# Patient Record
Sex: FEMALE | Race: WHITE | NOT HISPANIC OR LATINO | Employment: PART TIME | URBAN - METROPOLITAN AREA
[De-identification: names, ages, dates, MRNs, and addresses within clinical notes are randomized per-mention and may not be internally consistent; named-entity substitution may affect disease eponyms.]

---

## 2017-03-01 PROCEDURE — G0145 SCR C/V CYTO,THINLAYER,RESCR: HCPCS | Performed by: OBSTETRICS & GYNECOLOGY

## 2017-03-02 ENCOUNTER — LAB REQUISITION (OUTPATIENT)
Dept: LAB | Facility: HOSPITAL | Age: 57
End: 2017-03-02
Payer: COMMERCIAL

## 2017-03-02 DIAGNOSIS — Z12.72 ENCOUNTER FOR SCREENING FOR MALIGNANT NEOPLASM OF VAGINA: ICD-10-CM

## 2017-03-02 DIAGNOSIS — Z01.419 ENCOUNTER FOR GYNECOLOGICAL EXAMINATION WITHOUT ABNORMAL FINDING: ICD-10-CM

## 2017-03-02 DIAGNOSIS — Z11.51 ENCOUNTER FOR SCREENING FOR HUMAN PAPILLOMAVIRUS (HPV): ICD-10-CM

## 2017-03-07 LAB
LAB AP GYN PRIMARY INTERPRETATION: NORMAL
Lab: NORMAL

## 2017-09-05 DIAGNOSIS — E55.9 VITAMIN D DEFICIENCY: ICD-10-CM

## 2017-09-05 DIAGNOSIS — E78.5 HYPERLIPIDEMIA: ICD-10-CM

## 2017-09-05 DIAGNOSIS — R73.9 HYPERGLYCEMIA: ICD-10-CM

## 2017-09-22 ENCOUNTER — APPOINTMENT (OUTPATIENT)
Dept: LAB | Facility: MEDICAL CENTER | Age: 57
End: 2017-09-22
Payer: COMMERCIAL

## 2017-09-22 DIAGNOSIS — E78.5 HYPERLIPIDEMIA: ICD-10-CM

## 2017-09-22 DIAGNOSIS — E55.9 VITAMIN D DEFICIENCY: ICD-10-CM

## 2017-09-22 DIAGNOSIS — R73.9 HYPERGLYCEMIA: ICD-10-CM

## 2017-09-22 LAB
25(OH)D3 SERPL-MCNC: 30.4 NG/ML (ref 30–100)
ALBUMIN SERPL BCP-MCNC: 3.4 G/DL (ref 3.5–5)
ALP SERPL-CCNC: 85 U/L (ref 46–116)
ALT SERPL W P-5'-P-CCNC: 26 U/L (ref 12–78)
ANION GAP SERPL CALCULATED.3IONS-SCNC: 7 MMOL/L (ref 4–13)
AST SERPL W P-5'-P-CCNC: 17 U/L (ref 5–45)
BASOPHILS # BLD AUTO: 0.02 THOUSANDS/ΜL (ref 0–0.1)
BASOPHILS NFR BLD AUTO: 0 % (ref 0–1)
BILIRUB SERPL-MCNC: 0.39 MG/DL (ref 0.2–1)
BUN SERPL-MCNC: 10 MG/DL (ref 5–25)
CALCIUM SERPL-MCNC: 9.1 MG/DL (ref 8.3–10.1)
CHLORIDE SERPL-SCNC: 107 MMOL/L (ref 100–108)
CHOLEST SERPL-MCNC: 189 MG/DL (ref 50–200)
CO2 SERPL-SCNC: 27 MMOL/L (ref 21–32)
CREAT SERPL-MCNC: 0.87 MG/DL (ref 0.6–1.3)
EOSINOPHIL # BLD AUTO: 0.1 THOUSAND/ΜL (ref 0–0.61)
EOSINOPHIL NFR BLD AUTO: 2 % (ref 0–6)
ERYTHROCYTE [DISTWIDTH] IN BLOOD BY AUTOMATED COUNT: 12.9 % (ref 11.6–15.1)
EST. AVERAGE GLUCOSE BLD GHB EST-MCNC: 114 MG/DL
GFR SERPL CREATININE-BSD FRML MDRD: 74 ML/MIN/1.73SQ M
GLUCOSE P FAST SERPL-MCNC: 91 MG/DL (ref 65–99)
HBA1C MFR BLD: 5.6 % (ref 4.2–6.3)
HCT VFR BLD AUTO: 40.4 % (ref 34.8–46.1)
HDLC SERPL-MCNC: 42 MG/DL (ref 40–60)
HGB BLD-MCNC: 13.7 G/DL (ref 11.5–15.4)
LDLC SERPL CALC-MCNC: 114 MG/DL (ref 0–100)
LYMPHOCYTES # BLD AUTO: 2.91 THOUSANDS/ΜL (ref 0.6–4.47)
LYMPHOCYTES NFR BLD AUTO: 44 % (ref 14–44)
MCH RBC QN AUTO: 30.9 PG (ref 26.8–34.3)
MCHC RBC AUTO-ENTMCNC: 33.9 G/DL (ref 31.4–37.4)
MCV RBC AUTO: 91 FL (ref 82–98)
MONOCYTES # BLD AUTO: 0.47 THOUSAND/ΜL (ref 0.17–1.22)
MONOCYTES NFR BLD AUTO: 7 % (ref 4–12)
NEUTROPHILS # BLD AUTO: 3.06 THOUSANDS/ΜL (ref 1.85–7.62)
NEUTS SEG NFR BLD AUTO: 47 % (ref 43–75)
NRBC BLD AUTO-RTO: 0 /100 WBCS
PLATELET # BLD AUTO: 205 THOUSANDS/UL (ref 149–390)
PMV BLD AUTO: 11 FL (ref 8.9–12.7)
POTASSIUM SERPL-SCNC: 4 MMOL/L (ref 3.5–5.3)
PROT SERPL-MCNC: 7.6 G/DL (ref 6.4–8.2)
RBC # BLD AUTO: 4.44 MILLION/UL (ref 3.81–5.12)
SODIUM SERPL-SCNC: 141 MMOL/L (ref 136–145)
TRIGL SERPL-MCNC: 166 MG/DL
TSH SERPL DL<=0.05 MIU/L-ACNC: 1.95 UIU/ML (ref 0.36–3.74)
WBC # BLD AUTO: 6.58 THOUSAND/UL (ref 4.31–10.16)

## 2017-09-22 PROCEDURE — 84443 ASSAY THYROID STIM HORMONE: CPT

## 2017-09-22 PROCEDURE — 80053 COMPREHEN METABOLIC PANEL: CPT

## 2017-09-22 PROCEDURE — 85025 COMPLETE CBC W/AUTO DIFF WBC: CPT

## 2017-09-22 PROCEDURE — 82306 VITAMIN D 25 HYDROXY: CPT

## 2017-09-22 PROCEDURE — 83036 HEMOGLOBIN GLYCOSYLATED A1C: CPT

## 2017-09-22 PROCEDURE — 80061 LIPID PANEL: CPT

## 2017-09-22 PROCEDURE — 36415 COLL VENOUS BLD VENIPUNCTURE: CPT

## 2017-09-29 ENCOUNTER — ALLSCRIPTS OFFICE VISIT (OUTPATIENT)
Dept: OTHER | Facility: OTHER | Age: 57
End: 2017-09-29

## 2017-10-27 NOTE — PROGRESS NOTES
Assessment  1  GERD without esophagitis (530 81) (K21 9)   2  Family history of rectal cancer (V16 0) (Z80 0) : Sister   3  Family history of breast cancer (V16 3) (Z80 3) : Sister   4  FH: colon cancer (V16 0) (Z80 0) : Brother    Plan  Actinic keratoses    · Destruction benign lesions other than skin tags <=14 - POC; Status:Active - Perform  Order; Requested RJL:[id number];    · Rem Skin Tags Up To 15 Le - POC; Status:Active - Perform Order; Requested  MGS:[id number]; Aortic valve disease, Hyperlipidemia    · (1) COMPREHENSIVE METABOLIC PANEL; Status:Active; Requested for:2018;   GERD without esophagitis    · *1 - [facility] Co-Management  *  Status: Active   Requested for:   Care Summary provided  : Yes  Hyperlipidemia    · (1) LIPID PANEL, FASTING; Status:Active; Requested FVS:[id number];     Discussion/Summary    AK LEFT KNEE- LN2 APPLIEDRIGHT KNEE- LN2 APPLIEDTAG RIGHT FLANK- REMOVEDWORSENING WITH INCREASED DOSE- REFER TO GI FOR EVAL; SEES DR YANG FOR YEARLY COLONO- STRONG FAMILY HX OF COLON CA  Chief Complaint  1  Skin Lesions  PATIENT WITH SPOT ON HER LEG; - AK ON LEFT LATERAL KNEE; RIGHT MEDIAL KNEE, SKIN TAG ON RIGHT FLANK; HAS GERD- RECENT INCREASE IN PPI FROM 20-40 - DI DNOT HELP ; STILL HAVING GERD; HX OF RECTAL CA(SISTER) - COLON CANCER; History of Present Illness  Gastroesophageal Reflux Disease GERD Pathway: The patient is being seen for worsening symptoms of gastroesophageal reflux disease  The patient is currently experiencing symptoms Symptoms: heartburn,-- acid regurgitation,-- no nausea,-- no vomiting,-- no sore throat,-- no dysphagia,-- no odynophagia,-- no hematemesis,-- no melena-- and-- no anemia  no epigastric pain no abdominal pain Associated symptoms:  no anorexia,-- no early satiety,-- no bloating,-- no weight loss,-- no halitosis,-- no hoarseness,-- no cough-- and-- no wheezing  No associated symptoms are reported     Additional Risk Factors:  Patient is greater than 48years of age  -- Patient has been experiencing symptoms less than 10 years  Current treatment includes proton pump inhibitors  By report, there is good compliance with treatment, fair tolerance of treatment and fair symptom control  HPI: PT WITH WORSENING SX OF GERD- INCREASED DOSE TO 40 MG - STRONG FAMILY HX OF COLON/GI AND RECTAL CA (IN 50'S)- HAS COLONO YEARLY AND HAS WORSENING GERD; NO WT LOSS ; NO TOBACCO ; NO DYSPHAGIA TO SOLIDS OR LIQUIDSHAS LESION LEFT LEG ,R IGHT LEG AND RIGHT FLANK       Skin Lesions: Avani Able presents with complaints of skin lesions  Associated symptoms include multiple skin lesions, but-- no chills,-- no fever,-- no joint pain,-- no bleeding,-- no numbness,-- no shortness of breath,-- no vomiting-- and-- no wheezing  Review of Systems    Constitutional: No fever, no chills, feels well, no tiredness, no recent weight gain or loss,-- not feeling poorly-- and-- not feeling tired  ENT: no ear ache, no loss of hearing, no nosebleeds or nasal discharge, no sore throat or hoarseness,-- no earache,-- no nosebleeds,-- no hearing loss-- and-- no nasal discharge  Cardiovascular: no complaints of slow or fast heart rate, no chest pain, no palpitations, no leg claudication or lower extremity edema,-- the heart rate was not slow,-- no chest pain,-- no intermittent leg claudication,-- the heart rate was not fast,-- no palpitations-- and-- no lower extremity edema  Respiratory: no complaints of shortness of breath, no wheezing, no dyspnea on exertion, no orthopnea or PND,-- no shortness of breath,-- no cough,-- no wheezing-- and-- no shortness of breath during exertion  Breasts: no complaints of breast pain, breast lump or nipple discharge-- and-- no breast swelling     Gastrointestinal: GERD, but-- no complaints of abdominal pain, no constipation, no nausea or diarrhea, no vomiting, no bloody stools,-- as noted in HPI,-- no abdominal pain,-- no nausea,-- no constipation-- and-- no diarrhea  Genitourinary: no complaints of dysuria, no incontinence, no pelvic pain, no dysmenorrhea, no vaginal discharge or abnormal vaginal bleeding,-- no dysuria,-- no pelvic pain,-- no vaginal discharge,-- no incontinence-- and-- no dysmenorrhea  Musculoskeletal: no complaints of arthralgia, no myalgia, no joint swelling or stiffness, no limb pain or swelling,-- no joint swelling-- and-- no joint stiffness  Integumentary: skin lesion, but-- no complaints of skin rash or lesion, no itching or dry skin, no skin wounds,-- as noted in HPI,-- no itching-- and-- no skin wound  Neurological: no complaints of headache, no confusion, no numbness or tingling, no dizziness or fainting,-- no numbness,-- no tingling,-- no dizziness-- and-- no fainting  ROS reviewed  Active Problems  1  Aortic valve disease (424 1) (I35 9)   2  Encounter for screening for malignant neoplasm of colon (V76 51) (Z12 11)   3  Hyperglycemia (790 29) (R73 9)   4  Hyperlipidemia (272 4) (E78 5)   5  Nipple dermatitis (692 9) (L30 9)   6  Palpitations (785 1) (R00 2)   7  Plantar fasciitis (728 71) (M72 2)   8  PVC (premature ventricular contraction) (427 69) (I49 3)   9  Rectal polyp (569 0) (K62 1)   10  Shortness of breath (786 05) (R06 02)   11  Visit for screening mammogram (V76 12) (Z12 31)   12  Vitamin D deficiency (268 9) (E55 9)    Past Medical History  Active Problems And Past Medical History Reviewed: The active problems and past medical history were reviewed and updated today  Surgical History  Surgical History Reviewed: The surgical history was reviewed and updated today  Social History   · Alcohol Use (History)   · Never A Smoker  The social history was reviewed and updated today  The social history was reviewed and is unchanged  Family History  Family History Reviewed: The family history was reviewed and updated today  Current Meds   1   Omeprazole 40 MG Oral Capsule Delayed Release; TAKE 1 CAPSULE Daily; Therapy: [id number] to (Yogesh Piper)  Requested for: 37Zdo0502; Last   Rx:[id number] Ordered   2  Rosuvastatin Calcium 5 MG Oral Tablet; TAKE 1 TABLET DAILY; Therapy: 30Mhl2453 to (Evaluate:[id number])  Requested for: 99ZBB7258; Last   Rx:[id number] Ordered    The medication list was reviewed and updated today  Allergies  1  Penicillins    Vitals   Recorded: 03XWF8330 08:45AM   Temperature 98 4 F   Heart Rate 72   Respiration 18   Systolic 742   Diastolic 80   Height 5 ft 7 5 in   Weight 194 lb    BMI Calculated 29 94   BSA Calculated 2 01     Physical Exam    Constitutional   General appearance: No acute distress, well appearing and well nourished  Eyes   Conjunctiva and lids: No swelling, erythema or discharge  Pupils and irises: Equal, round and reactive to light  Ears, Nose, Mouth, and Throat   External inspection of ears and nose: Normal     Otoscopic examination: Tympanic membranes translucent with normal light reflex  Canals patent without erythema  Nasal mucosa, septum, and turbinates: Normal without edema or erythema  Oropharynx: Normal with no erythema, edema, exudate or lesions  Pulmonary   Respiratory effort: No increased work of breathing or signs of respiratory distress  Auscultation of lungs: Clear to auscultation  Auscultation of the lungs revealed no expiratory wheezing,-- normal expiratory time-- and-- no inspiratory wheezing  no rales or crackles were heard bilaterally  no rhonchi  no friction rub  no wheezing  no diminished breath sounds  no bronchial breath sounds  Cardiovascular   Palpation of heart: Normal PMI, no thrills  Auscultation of heart: Normal rate and rhythm, normal S1 and S2, without murmurs  The heart rate was normal  The rhythm was regular  Heart sounds: normal S1,-- normal S2,-- no S3-- and-- no S4  no murmurs were heard     Examination of extremities for edema and/or varicosities: Normal     Carotid pulses: Normal     Abdomen   Abdomen: Non-tender, no masses  Bowel sounds were normal  The abdomen was soft and nontender  no masses palpated  Liver and spleen: No hepatomegaly or splenomegaly  Lymphatic   Palpation of lymph nodes in neck: No lymphadenopathy  Musculoskeletal   Gait and station: Normal     Digits and nails: Normal without clubbing or cyanosis  Inspection/palpation of joints, bones, and muscles: Normal     Skin   Skin and subcutaneous tissue: Abnormal  -- SMALL AK LEFT LATERAL KNEE; SMALL AK RIGHT MEDIAL THIGH AT KNEE; SKIN TAG RIGHT FLANK  Neurologic   Cranial nerves: Cranial nerves 2-12 intact  Reflexes: 2+ and symmetric  Sensation: No sensory loss  Psychiatric   Orientation to person, place, and time: Normal     Mood and affect: Normal          Procedure    Procedure: excision of lesion  Indications for the procedure include SKIN TAG RIGHT ANT CHEST WALL  Risks, benefits, infection risk and bleeding risk were discussed with the patient  Written consent was obtained prior to the procedure  Procedure Note:   Anesthesia: 0 5 CC ml of lidocaine 2% without epinephrine  The lesion was located on the RIGHT ANT CHEST WALL  The patient was prepped and draped in the usual sterile fashion using Betadine,-- using alcohol-- and-- BLUNT DISSECTION- REMOVED LESION IN TOTAL; ALCL CLOSED THE WOUND  Destruction Technique: liquid nitrogen application  Dressing: The wound was cleaned and a sterile dressing was placed and AK LEFT KNEE 4 MM ; RIGHT KNEE 8 MM  Skin Lesion #2:   Procedure Note:  Anesthesia: The lesion was located on the LEFT LATERAL KNEE- LN2 APPLIED; RIGHT MEDIAL IKNEE - AK - LN2 APPLIED  The patient was prepped and draped in the usual sterile fashion using alcohol  LN2 APPLIED TO BOTH LESIONS  Post-Procedure:     Procedure: skin tag removal    Indications for the procedure include RIGHT FLANK     Risks, infection risk, bleeding risk and the risk of scarring were discussed with the patient--   written consent was obtained prior to the procedure  Procedure Note:   Anesthesia: 1 ml of lidocaine 2% without epinephrine  The patient was prepped using Betadine-- and-- using alcohol  Removal Technique: 11 BLADE--   1 skin tags removed  The hemostasis of the wound was achieved with aluminum chloride and L  Dressing: The wound was cleaned and a sterile dressing was placed  Post-Procedure:   Patient Status: the patient tolerated the procedure well  Complications: there were no complications  Follow-up in the office as needed  Future Appointments    Date/Time Provider Specialty Site   11/28/2017 03:20 PM SHARON Conway  [facility] [place] [address]     Signatures   Electronically signed by :  Mai [address] [place] [id number]; Sep 29 2017  3:12PM EST                       (Author)

## 2017-11-30 ENCOUNTER — ANESTHESIA EVENT (OUTPATIENT)
Dept: GASTROENTEROLOGY | Facility: HOSPITAL | Age: 57
End: 2017-11-30
Payer: COMMERCIAL

## 2017-12-01 ENCOUNTER — HOSPITAL ENCOUNTER (OUTPATIENT)
Facility: HOSPITAL | Age: 57
Setting detail: OUTPATIENT SURGERY
Discharge: HOME | End: 2017-12-01
Attending: COLON & RECTAL SURGERY | Admitting: COLON & RECTAL SURGERY
Payer: COMMERCIAL

## 2017-12-01 ENCOUNTER — GENERIC CONVERSION - ENCOUNTER (OUTPATIENT)
Dept: GASTROENTEROLOGY | Facility: CLINIC | Age: 57
End: 2017-12-01

## 2017-12-01 ENCOUNTER — GENERIC CONVERSION - ENCOUNTER (OUTPATIENT)
Dept: OTHER | Facility: OTHER | Age: 57
End: 2017-12-01

## 2017-12-01 ENCOUNTER — ANESTHESIA (OUTPATIENT)
Dept: GASTROENTEROLOGY | Facility: HOSPITAL | Age: 57
End: 2017-12-01
Payer: COMMERCIAL

## 2017-12-01 VITALS
HEART RATE: 74 BPM | TEMPERATURE: 97.2 F | DIASTOLIC BLOOD PRESSURE: 86 MMHG | WEIGHT: 190 LBS | HEIGHT: 67 IN | BODY MASS INDEX: 29.82 KG/M2 | RESPIRATION RATE: 16 BRPM | SYSTOLIC BLOOD PRESSURE: 117 MMHG | OXYGEN SATURATION: 99 %

## 2017-12-01 DIAGNOSIS — K62.0 ANAL POLYP: ICD-10-CM

## 2017-12-01 DIAGNOSIS — K21.9 GASTRO-ESOPHAGEAL REFLUX DISEASE WITHOUT ESOPHAGITIS: ICD-10-CM

## 2017-12-01 PROCEDURE — 88342 IMHCHEM/IMCYTCHM 1ST ANTB: CPT | Performed by: COLON & RECTAL SURGERY

## 2017-12-01 PROCEDURE — 88305 TISSUE EXAM BY PATHOLOGIST: CPT | Performed by: INTERNAL MEDICINE

## 2017-12-01 PROCEDURE — 88305 TISSUE EXAM BY PATHOLOGIST: CPT | Performed by: COLON & RECTAL SURGERY

## 2017-12-01 RX ORDER — PROPOFOL 10 MG/ML
INJECTION, EMULSION INTRAVENOUS AS NEEDED
Status: DISCONTINUED | OUTPATIENT
Start: 2017-12-01 | End: 2017-12-01 | Stop reason: SURG

## 2017-12-01 RX ORDER — SODIUM CHLORIDE 9 MG/ML
125 INJECTION, SOLUTION INTRAVENOUS CONTINUOUS
Status: DISCONTINUED | OUTPATIENT
Start: 2017-12-01 | End: 2017-12-01 | Stop reason: HOSPADM

## 2017-12-01 RX ADMIN — PROPOFOL 30 MG: 10 INJECTION, EMULSION INTRAVENOUS at 11:31

## 2017-12-01 RX ADMIN — PROPOFOL 30 MG: 10 INJECTION, EMULSION INTRAVENOUS at 11:25

## 2017-12-01 RX ADMIN — PROPOFOL 20 MG: 10 INJECTION, EMULSION INTRAVENOUS at 11:50

## 2017-12-01 RX ADMIN — SODIUM CHLORIDE: 0.9 INJECTION, SOLUTION INTRAVENOUS at 11:44

## 2017-12-01 RX ADMIN — PROPOFOL 30 MG: 10 INJECTION, EMULSION INTRAVENOUS at 11:33

## 2017-12-01 RX ADMIN — PROPOFOL 30 MG: 10 INJECTION, EMULSION INTRAVENOUS at 11:28

## 2017-12-01 RX ADMIN — PROPOFOL 30 MG: 10 INJECTION, EMULSION INTRAVENOUS at 11:36

## 2017-12-01 RX ADMIN — PROPOFOL 30 MG: 10 INJECTION, EMULSION INTRAVENOUS at 11:48

## 2017-12-01 RX ADMIN — PROPOFOL 100 MG: 10 INJECTION, EMULSION INTRAVENOUS at 11:43

## 2017-12-01 RX ADMIN — PROPOFOL 30 MG: 10 INJECTION, EMULSION INTRAVENOUS at 11:19

## 2017-12-01 RX ADMIN — SODIUM CHLORIDE: 0.9 INJECTION, SOLUTION INTRAVENOUS at 10:47

## 2017-12-01 RX ADMIN — PROPOFOL 50 MG: 10 INJECTION, EMULSION INTRAVENOUS at 11:17

## 2017-12-01 RX ADMIN — PROPOFOL 50 MG: 10 INJECTION, EMULSION INTRAVENOUS at 11:45

## 2017-12-01 RX ADMIN — PROPOFOL 100 MG: 10 INJECTION, EMULSION INTRAVENOUS at 11:16

## 2017-12-01 RX ADMIN — SODIUM CHLORIDE 125 ML/HR: 0.9 INJECTION, SOLUTION INTRAVENOUS at 09:27

## 2017-12-01 NOTE — H&P
History and Physical   Colon and Rectal Surgery   Romana Kahn 62 y o  female MRN: [id number]  Unit/Bed#: [facility] Encounter: [id number]  17   11:02 AM          ASSESSMENT: Surveillance, increased family risk, personal history of polyps  Recent abdominal pain, reflux for EGD as well today  Risks including not limited to bleeding, missed lesion, perforation requiring emergent surgery discussed/understood  PLAN:  Colonoscopy    History of Present Illness   HPI:  Romana Kahn is a 62 y o  female who presents for screening, history colon polyps  She also has had some recent abdominal pain moving screening up as well as new family history her sister was just recently  diagnosed with rectal cancer at 54yo, her brother was diagnosed with colon cancer at age 54 and passed away at 58  Lico Granville is having reflux as well and scheduled for EGD  Historical Information   Past Medical History:   Diagnosis Date    Family hx of colon cancer requiring screening colonoscopy     sister rectal ca,brother  colon cancer,sister breast cancer    GERD (gastroesophageal reflux disease)     High cholesterol     History of colon polyps     Osteoarthritis      Past Surgical History:   Procedure Laterality Date     SECTION      COLONOSCOPY N/A 2016    Procedure: COLONOSCOPY;  Surgeon: Victor M Mercedes MD;  Location: [facility];   Service:     COLONOSCOPY W/ POLYPECTOMY         Meds/Allergies     Prescriptions Prior to Admission   Medication    Nutritional Supplements (VITAMIN D MAINTENANCE PO)    omeprazole (PriLOSEC) 20 mg delayed release capsule    rosuvastatin (CRESTOR) 5 mg tablet         Current Facility-Administered Medications:     sodium chloride 0 9 % infusion, 125 mL/hr, Intravenous, Continuous, Carmen Ley MD, Last Rate: 125 mL/hr at 17 0927, 125 mL/hr at 17 5289    Allergies   Allergen Reactions    Penicillins Hives         Social History   History   Alcohol Use    Yes Comment: 2 drinks/night     History   Drug Use No     History   Smoking Status    Never Smoker   Smokeless Tobacco    Never Used         Family History: History reviewed  No pertinent family history        Objective     Current Vitals:   Blood Pressure: 136/86 (12/01/17 0914)  Pulse: 90 (12/01/17 0914)  Temperature: (!) 97 2 °F (36 2 °C) (12/01/17 0914)  Temp Source: Tympanic (12/01/17 0914)  Respirations: 18 (12/01/17 0914)  Height: 5' 7" (170 2 cm) (12/01/17 0914)  Weight - Scale: 86 2 kg (190 lb) (12/01/17 0914)  SpO2: 97 % (12/01/17 0914)  No intake or output data in the 24 hours ending 12/01/17 1102    Physical Exam:  General:no distress  Eyes:perrla/eomi  ENT:moist mucus membranes  Neck:supple  Pulm:no increased work of breathing  CV:sinus  Abdomen:soft,nontender  Rectal:normal perianal skin/sphincter tone, no masses palpated  Extremities:no edema  Lymphatics:no neck/axillary/groin lymphadenopathy     Ref Range & Units 9/22/17 1008   WBC 4 31 - 10 16 Thousand/uL 6 58    RBC 3 81 - 5 12 Million/uL 4 44    Hemoglobin 11 5 - 15 4 g/dL 13 7    Hematocrit 34 8 - 46 1 % 40 4    MCV 82 - 98 fL 91    MCH 26 8 - 34 3 pg 30 9    MCHC 31 4 - 37 4 g/dL 33 9    RDW 11 6 - 15 1 % 12 9    MPV 8 9 - 12 7 fL 11 0    Platelets 142 - 890 Thousands/uL 205    nRBC /100 WBCs 0    Neutrophils Relative 43 - 75 % 47    Lymphocytes Relative 14 - 44 % 44    Monocytes Relative 4 - 12 % 7    Eosinophils Relative 0 - 6 % 2    Basophils Relative 0 - 1 % 0    Neutrophils Absolute 1 85 - 7 62 Thousands/µL 3 06    Lymphocytes Absolute 0 60 - 4 47 Thousands/µL 2 91    Monocytes Absolute 0 17 - 1 22 Thousand/µL 0 47    Eosinophils Absolute 0 00 - 0 61 Thousand/µL 0 10    Basophils Absolute 0 00 - 0 10 Thousands/µL 0 02       Specimen Collected: 09/22/17 10:08 Last Resulted: 09/22/17 13:07                      Other Results from 9/22/2017        Comprehensive metabolic panel   Order: [id number]     Status:  Final result   Visible to patient:  No (Not Released)   Next appt:  None   Dx:  Hyperglycemia    Ref Range & Units 9/22/17 1008 Flag   Sodium 136 - 145 mmol/L 141     Potassium 3 5 - 5 3 mmol/L 4 0     Chloride 100 - 108 mmol/L 107     CO2 21 - 32 mmol/L 27     Anion Gap 4 - 13 mmol/L 7     BUN 5 - 25 mg/dL 10     Creatinine 0 60 - 1 30 mg/dL 0 87     Comments: Standardized to IDMS reference method   Glucose, Fasting 65 - 99 mg/dL 91     Comments:    Specimen collection should occur prior to Sulfasalazine administration due to the potential for falsely depressed results  Specimen collection should occur prior to Sulfapyridine administration due to the potential for falsely elevated results  Calcium 8 3 - 10 1 mg/dL 9 1     AST 5 - 45 U/L 17     Comments:    Specimen collection should occur prior to Sulfasalazine administration due to the potential for falsely depressed results  ALT 12 - 78 U/L 26     Comments:    Specimen collection should occur prior to Sulfasalazine and/or Sulfapyridine administration due to the potential for falsely depressed results      Alkaline Phosphatase 46 - 116 U/L 85     Total Protein 6 4 - 8 2 g/dL 7 6     Albumin 3 5 - 5 0 g/dL 3 4   L    Total Bilirubin 0 20 - 1 00 mg/dL 0 39     eGFR ml/min/1 73sq m 74     Narrative       [employer] Education

## 2017-12-01 NOTE — ANESTHESIA POSTPROCEDURE EVALUATION
Post-Op Assessment Note      CV Status:  Stable    Mental Status:  Alert and awake    Hydration Status:  Euvolemic    PONV Controlled:  Controlled    Airway Patency:  Patent    Post Op Vitals Reviewed: Yes          Staff: Anesthesiologist, CRNA           /61 (12/01/17 1158)    Temp     Pulse 81 (12/01/17 1158)   Resp 20 (12/01/17 1158)    SpO2 98 % (12/01/17 1158)

## 2017-12-01 NOTE — OP NOTE
**** GI/ENDOSCOPY REPORT ****     PATIENT NAME: Rivas Hawley ------ VISIT ID:  Patient ID:   [id number] YOB: 1960     INTRODUCTION: Colonoscopy - A 62 female patient presents for an outpatient   Colonoscopy at [facility]  PREVIOUS COLONOSCOPY: 2016     INDICATIONS: Surveillance, history polyps, TEM for tubulovillous adenoma   rectum 2014  Family history rectal cancer, colon cancer Recent abdominal   pain     CONSENT:  The benefits, risks, and alternatives to the procedure were   discussed and informed consent was obtained from the patient  PREPARATION: EKG, pulse, pulse oximetry and blood pressure were monitored   throughout the procedure  The patient was identified by myself both   verbally and by visual inspection of ID band  MEDICATIONS: Anesthesia-check records     PROCEDURE:  The endoscope was passed without difficulty through the anus   under direct visualization and advanced to the cecum, confirmed by   appendiceal orifice and ileocecal valve  The scope was withdrawn and the   mucosa was carefully examined  The quality of the preparation was adequate   prep  Cecal Intubation Time: 4 minutes(s) Scope Withdrawal Time: 18   minutes(s)     RECTAL EXAM: Normal rectal exam      FINDINGS:  TEM scar low rectum, no residual   Diminutive rectal polyp,   hyperplastic appearance, removed cold biopsy  COMPLICATIONS: There were no complications  IMPRESSIONS: TEM scar low rectum, no residual  Diminutive rectal polyp,   hyperplastic appearance, removed cold biopsy  RECOMMENDATIONS: Colonoscopy recommended in 3 years  Start high fiber   diet   Genetic counseling, Bryon Sylvester Phani 87 [phone]     ESTIMATED BLOOD LOSS:     PATHOLOGY SPECIMENS: Yes     PROCEDURE CODES: Colonoscopy with biopsy     ICD-9 Codes:     ICD-10 Codes:     PERFORMED BY: SHARON Naranjo  on 12/01/2017  Version 1, electronically signed by SHARON Lemus  on   12/01/2017 at 11:44

## 2017-12-01 NOTE — OP NOTE
**** GI/ENDOSCOPY REPORT ****     PATIENT NAME: Mela Bass - VISIT ID:  Patient ID: [id number]   YOB: 1960     INTRODUCTION: Esophagogastroduodenoscopy - A 62 female patient presents   for an outpatient Esophagogastroduodenoscopy at [address] [place] [address]  INDICATIONS: GERD  Pain located in the epigastrium  CONSENT: The benefits, risks, and alternatives to the procedure were   discussed and informed consent was obtained from the patient  PREPARATION:  EKG, pulse, pulse oximetry and blood pressure were monitored   throughout the procedure  ASA Classification: Class 2 - Patient has mild   to moderate systemic disturbance that may or may not be related to the   disorder requiring surgery  MEDICATIONS: Anesthesia-check records     PROCEDURE:  The endoscope was passed without difficulty through the mouth   under direct visualization and advanced to the 2nd portion of the   duodenum  The scope was withdrawn and the mucosa was carefully examined  Retroflexion was performed  FINDINGS:   Esophagus: The esophagus appeared to be normal   GE junction:   There was a small sliding hiatus hernia visible in the GE junction  Stomach: Multiple random biopsies was taken  A few small polyps was found   in the fundus  The largest polyp measured 6mm and was removed by   polypectomy with a cold snare  Duodenum: The duodenum appeared to be   normal    Multiple random biopsies was taken  COMPLICATIONS: There were no complications  IMPRESSIONS: Normal esophagus  A hiatus hernia found  Polyps found in the   fundus  Polypectomy was performed on the largest polyp (6mm)  Normal   duodenum  Multiple biopsies taken  RECOMMENDATIONS: Follow-up on the results of the biopsy specimens  Anti-reflux measures: Raise the head of the bed 4 to 6 inches  Avoid   smoking  Avoid excess coffee, tea or other caffeinated beverages   Avoid   garments that fit tightly through the abdomen  Avoid eating before bed  Continue current medications  Follow-up appointment with endoscopist on an   as needed basis  ESTIMATED BLOOD LOSS:     PATHOLOGY SPECIMENS: Multiple random biopsies taken  Polypectomy   performed, using, cold snare  Multiple random biopsies taken  PROCEDURE CODES:     ICD-9 Codes: 530 81 Esophageal reflux 789 06 Abdominal pain, epigastric   553 3 Diaphragmatic hernia without mention of obstruction or gangrene   211 1 Benign neoplasm of stomach     ICD-10 Codes: K21 Gastro-esophageal reflux disease R10 13 Epigastric pain   K44 Diaphragmatic hernia 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

## 2017-12-01 NOTE — ANESTHESIA PREPROCEDURE EVALUATION
Review of Systems/Medical History  Patient summary reviewed    No history of anesthetic complications     Cardiovascular  Hyperlipidemia,    Pulmonary  Negative pulmonary ROS ,        GI/Hepatic    GERD , Bowel prep       Negative  ROS        Endo/Other  Negative endo/other ROS      GYN       Hematology  Negative hematology ROS      Musculoskeletal  Negative musculoskeletal ROS        Neurology  Negative neurology ROS      Psychology   Negative psychology ROS            Physical Exam    Airway    Mallampati score: II  TM Distance: >3 FB  Neck ROM: full     Dental   No notable dental hx     Cardiovascular      Pulmonary      Other Findings        Anesthesia Plan  ASA Score- 2       Anesthesia Type-       Induction- intravenous  Informed Consent- Anesthetic plan and risks discussed with patient  I personally reviewed this patient with the CRNA  Discussed and agreed on the Anesthesia Plan with the CRNA  Cassandra Gandhi

## 2017-12-05 ENCOUNTER — GENERIC CONVERSION - ENCOUNTER (OUTPATIENT)
Dept: OTHER | Facility: OTHER | Age: 57
End: 2017-12-05

## 2018-01-12 VITALS
WEIGHT: 194 LBS | HEIGHT: 68 IN | HEART RATE: 72 BPM | RESPIRATION RATE: 18 BRPM | SYSTOLIC BLOOD PRESSURE: 108 MMHG | BODY MASS INDEX: 29.4 KG/M2 | TEMPERATURE: 98.4 F | DIASTOLIC BLOOD PRESSURE: 80 MMHG

## 2018-01-16 NOTE — RESULT NOTES
Verified Results  (1) COMPREHENSIVE METABOLIC PANEL [id number] 09:75MI Ross Gaytan     Test Name Result Flag Reference   GLUCOSE,RANDM 100 mg/dL     If the patient is fasting, the ADA then defines impaired fasting glucose as > 100 mg/dL and diabetes as > or equal to 123 mg/dL  SODIUM 141 mmol/L  136-145   POTASSIUM 4 3 mmol/L  3 5-5 3   CHLORIDE 108 mmol/L  100-108   CARBON DIOXIDE 28 mmol/L  21-32   ANION GAP (CALC) 5 mmol/L  4-13   BLOOD UREA NITROGEN 16 mg/dL  5-25   CREATININE 0 79 mg/dL  0 60-1 30   Standardized to IDMS reference method   CALCIUM 9 1 mg/dL  8 3-10 1   BILI, TOTAL 0 43 mg/dL  0 20-1 00   ALK PHOSPHATAS 89 U/L     ALT (SGPT) 29 U/L  12-78   AST(SGOT) 18 U/L  5-45   ALBUMIN 3 6 g/dL  3 5-5 0   TOTAL PROTEIN 7 1 g/dL  6 4-8 2   eGFR Non-African American      >60 0 ml/min/1 73sq [facility] [employer] Disease Education Program recommendations are as follows:  GFR calculation is accurate only with a steady state creatinine  Chronic Kidney disease less than 60 ml/min/1 73 sq  meters  Kidney failure less than 15 ml/min/1 73 sq  meters       (1) VITAMIN D 25-HYDROXY [id number] 09:04AM Ross Gaytan     Test Name Result Flag Reference   VIT D 25-HYDROX 33 2 ng/mL  30 0-100 0       Discussion/Summary   Labs are all good!!

## 2018-01-23 NOTE — RESULT NOTES
Discussion/Summary   Stomach and duodenum biopsies were negative  Verified Results  (1) TISSUE EXAM [id number] 11:48AM Saman Salgado     Test Name Result Flag Reference   LAB AP CASE REPORT (Report)     Surgical Pathology Report             Case: [id number]                   Authorizing Provider: Subhash Monaco MD   Collected:      12/01/2017 1139        Ordering Location:   [address] [place] [address]   Received:      12/01/2017 [address] [facility]                               Pathologist:      Guido Preciado DO                               Specimens:  A) - Polyp, Colorectal, rectal polyp  cold bx                             B) - Stomach, r/o hpylori                                       C) - Polyp, Stomach/Small Intestine, gastric polyp                           D) - Duodenum, r/o celiac   LAB AP FINAL DIAGNOSIS (Report)     A  Rectum, polyp, biopsy:  - Hyperplastic polyp  B  Stomach, biopsy:  - Changes of reactive gastropathy  - Immunostain for Helicobacter pylori is negative  C  Stomach polyp, biopsy:  - Fundic gland polyp  D  Duodenum, biopsy:  - Duodenal mucosa with no significant pathologic abnormalities  - No villous atrophy or increased intraepithelial lymphocytes identified  Interpretation performed at [facility] [address]  [facility],   [facility]    Electronically signed by Guido Preciado DO on 12/4/2017 at 11:27 AM   LAB AP SURGICAL ADDITIONAL INFORMATION (Report)     All controls performed with the immunohistochemical stains reported above   reacted appropriately  These tests were developed and their performance   characteristics determined by [facility] or   [address] [place] [address]  They may not be cleared or approved by the [place]  Food and Drug Administration  The FDA has determined that such clearance   or approval is not necessary  These tests are used for clinical purposes     They should not be regarded as investigational or for research  This   laboratory has been approved by [place] [id number], designated as a high-complexity   laboratory and is qualified to perform these tests  LAB AP GROSS DESCRIPTION (Report)     A  The specimen is received in formalin, labeled with the patient's name   and hospital number, and is designated rectal polyp biopsy  The   specimen consists of one tan soft tissue fragment measuring 0 4 cm in   greatest dimension  Entirely submitted in one cassette  B  The specimen is received in formalin, labeled with the patient's name   and hospital number, and is designated Stomach tissue  The specimen   consists of multiple tan soft tissue fragments measuring in loose   aggregate 0 6 x 0 2 x 0 2 cm  Entirely submitted in one cassette  C  The specimen is received in formalin, labeled with the patient's name   and hospital number, and is designated Gastric polyp  The specimen   consists of one tan soft tissue fragment measuring 0 4 cm in greatest   dimension  Entirely submitted in one cassette  D  The specimen is received in formalin, labeled with the patient's name   and hospital number, and is designated Duodenum  The specimen consists   of one tan soft tissue fragment measuring 0 4 cm in greatest dimension  Entirely submitted in one cassette  Note: The estimated total formalin fixation time based upon information   provided by the submitting clinician and the standard processing schedule   is less than 72 hours      Ayesha

## 2018-01-26 ENCOUNTER — OFFICE VISIT (OUTPATIENT)
Dept: FAMILY MEDICINE CLINIC | Facility: CLINIC | Age: 58
End: 2018-01-26
Payer: COMMERCIAL

## 2018-01-26 VITALS
DIASTOLIC BLOOD PRESSURE: 72 MMHG | WEIGHT: 199.2 LBS | BODY MASS INDEX: 31.27 KG/M2 | TEMPERATURE: 97.6 F | HEART RATE: 78 BPM | HEIGHT: 67 IN | SYSTOLIC BLOOD PRESSURE: 110 MMHG | RESPIRATION RATE: 16 BRPM

## 2018-01-26 DIAGNOSIS — J06.9 ACUTE URI: ICD-10-CM

## 2018-01-26 DIAGNOSIS — M67.431 GANGLION CYST OF DORSUM OF RIGHT WRIST: Primary | ICD-10-CM

## 2018-01-26 DIAGNOSIS — J02.9 SORE THROAT: ICD-10-CM

## 2018-01-26 PROBLEM — K21.9 GERD WITHOUT ESOPHAGITIS: Status: ACTIVE | Noted: 2017-09-29

## 2018-01-26 PROBLEM — L57.0 ACTINIC KERATOSES: Status: ACTIVE | Noted: 2017-09-29

## 2018-01-26 LAB — S PYO AG THROAT QL: NEGATIVE

## 2018-01-26 PROCEDURE — 99214 OFFICE O/P EST MOD 30 MIN: CPT | Performed by: FAMILY MEDICINE

## 2018-01-26 PROCEDURE — 87880 STREP A ASSAY W/OPTIC: CPT | Performed by: FAMILY MEDICINE

## 2018-01-26 RX ORDER — PREDNISONE 10 MG/1
10 TABLET ORAL 2 TIMES DAILY WITH MEALS
Qty: 8 TABLET | Refills: 0 | Status: SHIPPED | OUTPATIENT
Start: 2018-01-26 | End: 2018-01-30

## 2018-01-26 NOTE — PROGRESS NOTES
Assessment/Plan: Kermit Jay is a 62 y o  female with:   Problem List Items Addressed This Visit     None      Visit Diagnoses     Ganglion cyst of dorsum of right wrist    -  Primary    Sore throat        Relevant Orders    POCT rapid strepA    Acute URI            1  Acute URI with mostly pharyngitis type symptoms- Centor score of 2 (tonsillar erythema, absent cough, tender LAD, but -1 for age), so rapid strep was done and was negative  -Reviewed supportive care  -Prednisone burst  -Nasal saline burst  -No Abx indicated  -Patient instructions as below    2  Ganglion cyst- present for over a year, causing intermittent pain and cosmetic concerns  -Referral to hand surgeon        Subjective:     Kermit Jay is a 62 y o  female who presents today with   Chief Complaint   Patient presents with    Sore Throat    Earache         3-4 days ago had fevers and fatigue, but didn't measure a temperature  Alternating fevers/chills, feeling more tired, has headaches because of the nasal congestion, thinks that she has a sore throat and swollen glands, but no problems swallowing  Tylenol PRN fevers, Vicks for decongestion    Sick contacts: works at [employer]  Flu shot: No  Non-smoker    Also has a ganglion cyst on her right hand that has grown after she hit it against a door 1 year ago  Thinks there may be some calcium deposits in it  Doesn't really hurt or bother her, but occ has pain when she lifts heavy things  Sore Throat    This is a new problem  The current episode started in the past 7 days  The problem has been gradually improving  There has been no fever  The fever has been present for 1 to 2 days  The patient is experiencing no pain  Associated symptoms include congestion, coughing, ear pain, a plugged ear sensation and swollen glands  Pertinent negatives include no abdominal pain, diarrhea, ear discharge, hoarse voice, shortness of breath or vomiting  She has tried acetaminophen for the symptoms   The treatment provided no relief  Earache    Associated symptoms include coughing and a sore throat  Pertinent negatives include no abdominal pain, diarrhea, ear discharge, rhinorrhea or vomiting  Current Outpatient Prescriptions   Medication Sig Dispense Refill    Nutritional Supplements (VITAMIN D MAINTENANCE PO) Take 2,000 Units by mouth daily        omeprazole (PriLOSEC) 40 MG capsule Take 40 mg by mouth daily        rosuvastatin (CRESTOR) 5 mg tablet Take 5 mg by mouth daily  No current facility-administered medications for this visit  Allergies   Allergen Reactions    Penicillins Hives     Past Medical History:   Diagnosis Date    Family hx of colon cancer requiring screening colonoscopy     sister rectal ca,brother  colon cancer,sister breast cancer    GERD (gastroesophageal reflux disease)     High cholesterol     History of colon polyps     Osteoarthritis      Social History   Substance Use Topics    Smoking status: Never Smoker    Smokeless tobacco: Never Used    Alcohol use Yes      Comment: 2 drinks/night     Patient Active Problem List   Diagnosis    Actinic keratoses    Aortic valve disease    Hyperlipidemia    PVC (premature ventricular contraction)    Vitamin D deficiency    GERD without esophagitis       Review of Systems   Constitutional: Positive for chills  Negative for fever  HENT: Positive for congestion, ear pain and sore throat  Negative for ear discharge, hoarse voice, postnasal drip, rhinorrhea, sinus pain and sinus pressure  Respiratory: Positive for cough  Negative for shortness of breath and wheezing  Cardiovascular: Negative for chest pain and palpitations  Gastrointestinal: Negative for abdominal pain, diarrhea, nausea and vomiting           Objective:    /72 (BP Location: Left arm, Cuff Size: Large)   Pulse 78   Temp 97 6 °F (36 4 °C) (Tympanic)   Resp 16   Ht 5' 7" (1 702 m)   Wt 90 4 kg (199 lb 3 2 oz)   BMI 31 20 kg/m² Physical Exam   Constitutional: She appears well-developed and well-nourished  No distress  HENT:   Head: Normocephalic and atraumatic  Right Ear: Hearing and external ear normal  No drainage  Tympanic membrane is bulging  Tympanic membrane is not perforated and not erythematous  Left Ear: Hearing and external ear normal  No drainage  Tympanic membrane is bulging  Tympanic membrane is not perforated and not erythematous  Mouth/Throat: Uvula is midline and mucous membranes are normal  Posterior oropharyngeal erythema present  No oropharyngeal exudate, posterior oropharyngeal edema or tonsillar abscesses  Eyes: Conjunctivae are normal  Pupils are equal, round, and reactive to light  Right eye exhibits no discharge  Left eye exhibits no discharge  Neck: Normal range of motion  Neck supple  Cardiovascular: Normal rate and regular rhythm  Pulmonary/Chest: Effort normal and breath sounds normal  No respiratory distress  She has no wheezes  Musculoskeletal:   2-3 cm ganglion cyst on dorsum of right wrist   Lymphadenopathy:     She has cervical adenopathy  Skin: She is not diaphoretic  Vitals reviewed  Patient Instructions   -Start taking Mucinex 1200mg twice a day  -Drink at least 10 glasses of water per day  -Honey as been shown to help with coughs  -You can use Tylenol as needed for fevers  -Practice good hygiene and cover your mouth if you cough  -Call us back if you have new or worsening fevers and other symptoms  -Patient instructions handout provided          Rhinosinusitis   AMBULATORY CARE:   Rhinosinusitis (RS)  is inflammation of your nose and sinuses  It commonly begins as a virus, often as a common cold  Viruses usually last 7 to 10 days and do not need treatment  When the virus does not get better on its own, you may have bacterial RS  This means that bacteria have begun to grow inside your sinuses  Acute RS lasts less than 4 weeks  Chronic RS lasts 12 weeks or more   Recurrent RS is when you have 4 or more episodes of RS in one year  Your signs and symptoms  may be worse when you lie on your back or try to sleep  You may have any of the following:  · Stuffy nose and reduced sense of smell     · Runny nose with thick yellow or green mucus     · Pressure or pain on your face or a headache     · Pain in your teeth or bad breath     · Ear pain or pressure     · Fever or cough     · Tiredness  Seek care immediately if:   · You have double vision or you cannot see  · You have a stiff neck, a fever, or a bad headache  · Your eyeball bulges out or you cannot move your eye  · Your eye and eyelid are red, swollen, and painful  · You cannot open your eye  · You are more sleepy than normal, or you notice changes in your ability to think, move, or talk  · You have swelling of your forehead or scalp  Contact your healthcare provider if:   · Your symptoms are worse or do not improve after 3 to 5 days of treatment  · You have questions or concerns about your condition or care  Treatment for rhinosinusitis  may include any of the following:  · Acetaminophen  decreases pain and fever  It is available without a doctor's order  Ask how much to take and how often to take it  Follow directions  Acetaminophen can cause liver damage if not taken correctly  · NSAIDs , such as ibuprofen, help decrease swelling, pain, and fever  This medicine is available with or without a doctor's order  NSAIDs can cause stomach bleeding or kidney problems in certain people  If you take blood thinner medicine, always ask your healthcare provider if NSAIDs are safe for you  Always read the medicine label and follow directions  · Nasal steroid sprays  decrease inflammation in your nose and sinuses  · Decongestants  reduce swelling and drain mucus in the nose and sinuses  They may help you breathe easier  · Antihistamines  dry mucus in the nose and relieve sneezing       · Antibiotics  treat a bacterial infection and may be needed if your symptoms do not improve or they get worse  · Take your medicine as directed  Contact your healthcare provider if you think your medicine is not helping or if you have side effects  Tell him or her if you are allergic to any medicine  Keep a list of the medicines, vitamins, and herbs you take  Include the amounts, and when and why you take them  Bring the list or the pill bottles to follow-up visits  Carry your medicine list with you in case of an emergency  Self-care:   · Rinse your sinuses  Use a sinus rinse device to rinse your nasal passages with a saline (salt water) solution  This will help thin the mucus in your nose and rinse away pollen and dirt  It will also help reduce swelling so you can breathe normally  Ask your healthcare provider how often to do this  · Breathe in steam   Heat a bowl of water until you see steam  Lean over the bowl and make a tent over your head with a large towel  Breathe deeply for about 20 minutes  Be careful not to get too close to the steam or burn yourself  Do this 3 times a day  You can also breathe deeply when you take a hot shower  · Sleep with your head elevated  Place an extra pillow under your head before you go to sleep to help your sinuses drain  · Drink liquids as directed  Ask your healthcare provider how much liquid to drink each day and which liquids are best for you  Liquids will thin the mucus in your nose and help it drain  Avoid drinks that contain alcohol or caffeine  · Do not smoke, and avoid secondhand smoke  Nicotine and other chemicals in cigarettes and cigars can make your symptoms worse  Ask your healthcare provider for information if you currently smoke and need help to quit  E-cigarettes or smokeless tobacco still contain nicotine  Talk to your healthcare provider before you use these products  Follow up with your healthcare provider as directed:   Follow up if your symptoms are worse or not better after 3 to 5 days of treatment  Write down your questions so you remember to ask them during your visits  © 2017 [employer] [employer] Information is for End User's use only and may not be sold, redistributed or otherwise used for commercial purposes  All illustrations and images included in CareNotes® are the copyrighted property of [employer]  or [employer]  The above information is an  only  It is not intended as medical advice for individual conditions or treatments  Talk to your doctor, nurse or pharmacist before following any medical regimen to see if it is safe and effective for you  Pharyngitis   AMBULATORY CARE:   Pharyngitis , or sore throat, is inflammation of the tissues and structures in your pharynx (throat)  Pharyngitis is most often caused by bacteria  It may also be caused by a cold or flu virus  Other causes include smoking, allergies, or acid reflux  Signs and symptoms that may occur with pharyngitis:   · Sore throat or pain when you swallow    · Fever, chills, and body aches    · Hoarse or raspy voice    · Cough, runny or stuffy nose, itchy or watery eyes    · Headache    · Upset stomach and loss of appetite    · Mild neck stiffness    · Swollen glands that feel like hard lumps when you touch your neck    · White and yellow pus-filled blisters in the back of your throat  Call 911 for any of the following:   · You have trouble breathing or swallowing because your throat is swollen or sore  Seek care immediately if:   · You are drooling because it hurts too much to swallow  · Your fever is higher than 102? F (39?C) or lasts longer than 3 days  · You are confused  · You taste blood in your throat  Contact your healthcare provider if:   · Your throat pain gets worse  · You have a painful lump in your throat that does not go away after 5 days  · Your symptoms do not improve after 5 days      · You have questions or concerns about your condition or care  Treatment for pharyngitis:  Viral pharyngitis will go away on its own without treatment  Your sore throat should start to feel better in 3 to 5 days for both viral and bacterial infections  You may need any of the following:  · Antibiotics  treat a bacterial infection  · NSAIDs , such as ibuprofen, help decrease swelling, pain, and fever  NSAIDs can cause stomach bleeding or kidney problems in certain people  If you take blood thinner medicine, always ask your healthcare provider if NSAIDs are safe for you  Always read the medicine label and follow directions  · Acetaminophen  decreases pain and fever  It is available without a doctor's order  Ask how much to take and how often to take it  Follow directions  Acetaminophen can cause liver damage if not taken correctly  Manage your symptoms:   · Gargle salt water  Mix ¼ teaspoon salt in an 8 ounce glass of warm water and gargle  This may help decrease swelling in your throat  · Drink liquids as directed  You may need to drink more liquids than usual  Liquids may help soothe your throat and prevent dehydration  Ask how much liquid to drink each day and which liquids are best for you  · Use a cool-steam humidifier  to help moisten the air in your room and calm your cough  · Soothe your throat  with cough drops, ice, soft foods, or popsicles  Prevent the spread of pharyngitis:  Cover your mouth and nose when you cough or sneeze  Do not share food or drinks  Wash your hands often  Use soap and water  If soap and water are unavailable, use an alcohol based hand   Follow up with your healthcare provider as directed:  Write down your questions so you remember to ask them during your visits  © 2017 [employer] Information is for End User's use only and may not be sold, redistributed or otherwise used for commercial purposes   All illustrations and images included in CareNotes® are the copyrighted property of [employer]  or [employer]  The above information is an  only  It is not intended as medical advice for individual conditions or treatments  Talk to your doctor, nurse or pharmacist before following any medical regimen to see if it is safe and effective for you  No future appointments

## 2018-01-26 NOTE — PATIENT INSTRUCTIONS
-Start taking Mucinex 1200mg twice a day  -Drink at least 10 glasses of water per day  -Honey as been shown to help with coughs  -You can use Tylenol as needed for fevers  -Practice good hygiene and cover your mouth if you cough  -Call us back if you have new or worsening fevers and other symptoms  -Patient instructions handout provided          Rhinosinusitis   AMBULATORY CARE:   Rhinosinusitis (RS)  is inflammation of your nose and sinuses  It commonly begins as a virus, often as a common cold  Viruses usually last 7 to 10 days and do not need treatment  When the virus does not get better on its own, you may have bacterial RS  This means that bacteria have begun to grow inside your sinuses  Acute RS lasts less than 4 weeks  Chronic RS lasts 12 weeks or more  Recurrent RS is when you have 4 or more episodes of RS in one year  Your signs and symptoms  may be worse when you lie on your back or try to sleep  You may have any of the following:  · Stuffy nose and reduced sense of smell     · Runny nose with thick yellow or green mucus     · Pressure or pain on your face or a headache     · Pain in your teeth or bad breath     · Ear pain or pressure     · Fever or cough     · Tiredness  Seek care immediately if:   · You have double vision or you cannot see  · You have a stiff neck, a fever, or a bad headache  · Your eyeball bulges out or you cannot move your eye  · Your eye and eyelid are red, swollen, and painful  · You cannot open your eye  · You are more sleepy than normal, or you notice changes in your ability to think, move, or talk  · You have swelling of your forehead or scalp  Contact your healthcare provider if:   · Your symptoms are worse or do not improve after 3 to 5 days of treatment  · You have questions or concerns about your condition or care  Treatment for rhinosinusitis  may include any of the following:  · Acetaminophen  decreases pain and fever   It is available without a doctor's order  Ask how much to take and how often to take it  Follow directions  Acetaminophen can cause liver damage if not taken correctly  · NSAIDs , such as ibuprofen, help decrease swelling, pain, and fever  This medicine is available with or without a doctor's order  NSAIDs can cause stomach bleeding or kidney problems in certain people  If you take blood thinner medicine, always ask your healthcare provider if NSAIDs are safe for you  Always read the medicine label and follow directions  · Nasal steroid sprays  decrease inflammation in your nose and sinuses  · Decongestants  reduce swelling and drain mucus in the nose and sinuses  They may help you breathe easier  · Antihistamines  dry mucus in the nose and relieve sneezing  · Antibiotics  treat a bacterial infection and may be needed if your symptoms do not improve or they get worse  · Take your medicine as directed  Contact your healthcare provider if you think your medicine is not helping or if you have side effects  Tell him or her if you are allergic to any medicine  Keep a list of the medicines, vitamins, and herbs you take  Include the amounts, and when and why you take them  Bring the list or the pill bottles to follow-up visits  Carry your medicine list with you in case of an emergency  Self-care:   · Rinse your sinuses  Use a sinus rinse device to rinse your nasal passages with a saline (salt water) solution  This will help thin the mucus in your nose and rinse away pollen and dirt  It will also help reduce swelling so you can breathe normally  Ask your healthcare provider how often to do this  · Breathe in steam   Heat a bowl of water until you see steam  Lean over the bowl and make a tent over your head with a large towel  Breathe deeply for about 20 minutes  Be careful not to get too close to the steam or burn yourself  Do this 3 times a day  You can also breathe deeply when you take a hot shower       · Sleep with your head elevated  Place an extra pillow under your head before you go to sleep to help your sinuses drain  · Drink liquids as directed  Ask your healthcare provider how much liquid to drink each day and which liquids are best for you  Liquids will thin the mucus in your nose and help it drain  Avoid drinks that contain alcohol or caffeine  · Do not smoke, and avoid secondhand smoke  Nicotine and other chemicals in cigarettes and cigars can make your symptoms worse  Ask your healthcare provider for information if you currently smoke and need help to quit  E-cigarettes or smokeless tobacco still contain nicotine  Talk to your healthcare provider before you use these products  Follow up with your healthcare provider as directed: Follow up if your symptoms are worse or not better after 3 to 5 days of treatment  Write down your questions so you remember to ask them during your visits  © 2017 [address] [employer] Information is for End User's use only and may not be sold, redistributed or otherwise used for commercial purposes  All illustrations and images included in CareNotes® are the copyrighted property of [employer]  or [address]  The above information is an  only  It is not intended as medical advice for individual conditions or treatments  Talk to your doctor, nurse or pharmacist before following any medical regimen to see if it is safe and effective for you  Pharyngitis   AMBULATORY CARE:   Pharyngitis , or sore throat, is inflammation of the tissues and structures in your pharynx (throat)  Pharyngitis is most often caused by bacteria  It may also be caused by a cold or flu virus  Other causes include smoking, allergies, or acid reflux     Signs and symptoms that may occur with pharyngitis:   · Sore throat or pain when you swallow    · Fever, chills, and body aches    · Hoarse or raspy voice    · Cough, runny or stuffy nose, itchy or watery eyes    · Headache    · Upset stomach and loss of appetite    · Mild neck stiffness    · Swollen glands that feel like hard lumps when you touch your neck    · White and yellow pus-filled blisters in the back of your throat  Call 911 for any of the following:   · You have trouble breathing or swallowing because your throat is swollen or sore  Seek care immediately if:   · You are drooling because it hurts too much to swallow  · Your fever is higher than 102? F (39?C) or lasts longer than 3 days  · You are confused  · You taste blood in your throat  Contact your healthcare provider if:   · Your throat pain gets worse  · You have a painful lump in your throat that does not go away after 5 days  · Your symptoms do not improve after 5 days  · You have questions or concerns about your condition or care  Treatment for pharyngitis:  Viral pharyngitis will go away on its own without treatment  Your sore throat should start to feel better in 3 to 5 days for both viral and bacterial infections  You may need any of the following:  · Antibiotics  treat a bacterial infection  · NSAIDs , such as ibuprofen, help decrease swelling, pain, and fever  NSAIDs can cause stomach bleeding or kidney problems in certain people  If you take blood thinner medicine, always ask your healthcare provider if NSAIDs are safe for you  Always read the medicine label and follow directions  · Acetaminophen  decreases pain and fever  It is available without a doctor's order  Ask how much to take and how often to take it  Follow directions  Acetaminophen can cause liver damage if not taken correctly  Manage your symptoms:   · Gargle salt water  Mix ¼ teaspoon salt in an 8 ounce glass of warm water and gargle  This may help decrease swelling in your throat  · Drink liquids as directed  You may need to drink more liquids than usual  Liquids may help soothe your throat and prevent dehydration   Ask how much liquid to drink each day and which liquids are best for you  · Use a cool-steam humidifier  to help moisten the air in your room and calm your cough  · Soothe your throat  with cough drops, ice, soft foods, or popsicles  Prevent the spread of pharyngitis:  Cover your mouth and nose when you cough or sneeze  Do not share food or drinks  Wash your hands often  Use soap and water  If soap and water are unavailable, use an alcohol based hand   Follow up with your healthcare provider as directed:  Write down your questions so you remember to ask them during your visits  © 2017 [address]  Information is for End User's use only and may not be sold, redistributed or otherwise used for commercial purposes  All illustrations and images included in CareNotes® are the copyrighted property of [employer]  or [address]  The above information is an  only  It is not intended as medical advice for individual conditions or treatments  Talk to your doctor, nurse or pharmacist before following any medical regimen to see if it is safe and effective for you

## 2018-03-29 DIAGNOSIS — I35.9 NONRHEUMATIC AORTIC VALVE DISORDER: ICD-10-CM

## 2018-03-29 DIAGNOSIS — E78.5 HYPERLIPIDEMIA: ICD-10-CM

## 2018-04-12 ENCOUNTER — OFFICE VISIT (OUTPATIENT)
Dept: OBGYN CLINIC | Facility: MEDICAL CENTER | Age: 58
End: 2018-04-12
Payer: COMMERCIAL

## 2018-04-12 VITALS
DIASTOLIC BLOOD PRESSURE: 84 MMHG | BODY MASS INDEX: 31.55 KG/M2 | SYSTOLIC BLOOD PRESSURE: 130 MMHG | HEART RATE: 83 BPM | WEIGHT: 201 LBS | HEIGHT: 67 IN

## 2018-04-12 DIAGNOSIS — M67.431 GANGLION CYST OF DORSUM OF RIGHT WRIST: Primary | ICD-10-CM

## 2018-04-12 PROCEDURE — 20605 DRAIN/INJ JOINT/BURSA W/O US: CPT | Performed by: ORTHOPAEDIC SURGERY

## 2018-04-12 PROCEDURE — 99204 OFFICE O/P NEW MOD 45 MIN: CPT | Performed by: ORTHOPAEDIC SURGERY

## 2018-04-12 RX ORDER — LIDOCAINE HYDROCHLORIDE 10 MG/ML
1 INJECTION, SOLUTION INFILTRATION; PERINEURAL
Status: COMPLETED | OUTPATIENT
Start: 2018-04-12 | End: 2018-04-12

## 2018-04-12 RX ADMIN — LIDOCAINE HYDROCHLORIDE 1 ML: 10 INJECTION, SOLUTION INFILTRATION; PERINEURAL at 15:05

## 2018-04-12 NOTE — PROGRESS NOTES
Assessment:  1  Ganglion cyst of dorsum of right wrist       Patient Active Problem List   Diagnosis    Actinic keratoses    Aortic valve disease    Hyperlipidemia    PVC (premature ventricular contraction)    Vitamin D deficiency    GERD without esophagitis    Ganglion cyst of dorsum of right wrist           Plan       aspiration ganglion cyst volar splint to be utilized  for up to 3 weeks follow up with us if the cyst comes back and she would likely either Re aspirated or excised   I did tell her the chances of recurrence is about 50% with aspiration about 20% with excision  Subjective:     Patient ID:    Chief Complaint:Bianca Francisco 62 y o  female      HPI    Patient comes in today with regards to Right hand  The patient reports that there is  No pain  This has been going on for  At least a year  The pain is rated at0 at its best and4 at its worst    about 1 year ago she was walking bumped the wrist and the cyst got larger  No other injuries or issues  The patient has taken  nothing for treatment  The following portions of the patient's history were reviewed and updated as appropriate: allergies, current medications, past family history, past social history, past surgical history and problem list         Social History     Social History    Marital status: /Civil Union     Spouse name: N/A    Number of children: N/A    Years of education: N/A     Occupational History    Not on file       Social History Main Topics    Smoking status: Never Smoker    Smokeless tobacco: Never Used    Alcohol use Yes      Comment: 2 drinks/night    Drug use: No    Sexual activity: Not on file     Other Topics Concern    Not on file     Social History Narrative    No narrative on file     Past Medical History:   Diagnosis Date    Family hx of colon cancer requiring screening colonoscopy     sister rectal ca,brother  colon cancer,sister breast cancer    GERD (gastroesophageal reflux disease)     High cholesterol     History of colon polyps     Osteoarthritis      Past Surgical History:   Procedure Laterality Date     SECTION      COLONOSCOPY N/A 2016    Procedure: COLONOSCOPY;  Surgeon: Sabino Hills MD;  [facility]; Service:     COLONOSCOPY W/ POLYPECTOMY      PA COLONOSCOPY FLX DX W/COLLJ SPEC WHEN PFRMD N/A 2017    Procedure: COLONOSCOPY;  Surgeon: Sabino Hills MD;  [facility]; Service: Colorectal    PA ESOPHAGOGASTRODUODENOSCOPY TRANSORAL DIAGNOSTIC N/A 2017    Procedure: ESOPHAGOGASTRODUODENOSCOPY (EGD); Surgeon: Gale Flores MD;  Location: [facility]; Service: Gastroenterology     Allergies   Allergen Reactions    Penicillins Hives     Current Outpatient Prescriptions on File Prior to Visit   Medication Sig Dispense Refill    Nutritional Supplements (VITAMIN D MAINTENANCE PO) Take 2,000 Units by mouth daily        omeprazole (PriLOSEC) 40 MG capsule Take 40 mg by mouth daily        rosuvastatin (CRESTOR) 5 mg tablet Take 5 mg by mouth daily   sodium chloride (OCEAN) 0 65 % nasal spray 1 spray into each nostril 4 (four) times a day as needed (rhinorrhea) 15 mL 0     No current facility-administered medications on file prior to visit  Objective:    Review of Systems   Constitutional: Negative  HENT: Negative  Eyes: Negative  Respiratory: Negative  Cardiovascular: Negative  Gastrointestinal: Negative  Endocrine: Negative  Genitourinary: Negative  Skin: Negative  Allergic/Immunologic: Negative  Neurological: Negative  Hematological: Negative  Psychiatric/Behavioral: Negative  Right Hand Exam   Right hand exam is normal     Range of Motion   The patient has normal right wrist ROM  Muscle Strength   The patient has normal right wrist strength      Other   Erythema: absent  Sensation: normal  Pulse: present      Left Hand Exam     Tenderness   The patient is experiencing tenderness in the dorsal area  Range of Motion   The patient has normal left wrist ROM  Muscle Strength   The patient has normal left wrist strength  Physical Exam   Constitutional: She is oriented to person, place, and time  She appears well-developed  HENT:   Head: Normocephalic  Eyes: Pupils are equal, round, and reactive to light  Neck: Normal range of motion  Cardiovascular: Normal rate  Pulmonary/Chest: Effort normal    Abdominal: She exhibits no distension  Neurological: She is alert and oriented to person, place, and time  Skin: Skin is warm  Psychiatric: She has a normal mood and affect  Nursing note and vitals reviewed  Medium joint arthrocentesis  Date/Time: 4/12/2018 3:05 PM  Consent given by: patient  Site marked: site marked  Supporting Documentation  Indications: joint swelling   Procedure Details  Location: wrist - R radiocarpal  Needle size: 18 G  Approach: dorsal  Medications administered: 1 mL lidocaine 1 %    Aspirate amount: 5 mL  Aspirate: clear (Clear gelatinous material)  Patient tolerance: patient tolerated the procedure well with no immediate complications               I have personally reviewed pertinent films in PACS  Portions of the record may have been created with voice recognition software   Occasional wrong word or "sound a like" substitutions may have occurred due to the inherent limitations of voice recognition software   Read the chart carefully and recognize, using context, where substitutions have occurred

## 2018-05-10 ENCOUNTER — OFFICE VISIT (OUTPATIENT)
Dept: OBGYN CLINIC | Facility: MEDICAL CENTER | Age: 58
End: 2018-05-10
Payer: COMMERCIAL

## 2018-05-10 VITALS
HEIGHT: 67 IN | SYSTOLIC BLOOD PRESSURE: 134 MMHG | HEART RATE: 88 BPM | BODY MASS INDEX: 31.55 KG/M2 | WEIGHT: 201 LBS | DIASTOLIC BLOOD PRESSURE: 83 MMHG

## 2018-05-10 DIAGNOSIS — R20.0 NUMBNESS AND TINGLING IN BOTH HANDS: ICD-10-CM

## 2018-05-10 DIAGNOSIS — M67.431 GANGLION CYST OF DORSUM OF RIGHT WRIST: Primary | ICD-10-CM

## 2018-05-10 DIAGNOSIS — R20.2 NUMBNESS AND TINGLING IN BOTH HANDS: ICD-10-CM

## 2018-05-10 PROCEDURE — 99213 OFFICE O/P EST LOW 20 MIN: CPT | Performed by: ORTHOPAEDIC SURGERY

## 2018-05-10 NOTE — PROGRESS NOTES
Assessment:  No diagnosis found  Patient Active Problem List   Diagnosis    Actinic keratoses    Aortic valve disease    Hyperlipidemia    PVC (premature ventricular contraction)    Vitamin D deficiency    GERD without esophagitis    Ganglion cyst of dorsum of right wrist           Plan      Dr Sangeeta Huertas also examined the patient with me  She is having symptoms of carpal tunnel, but has never had an EMG  We will order a EMG now and have the patient come back for a preop appt for possible carpal tunnel release with excision of right dorsal ganglion cyst   After about 4 weeks, she may then have carpal tunnel release on the left  She is having carpal tunnel symptoms in both hands  Subjective:     Patient ID:    Chief Complaint:Bianca Francisco 62 y o  female      HPI    63 y/o RHD 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  Patient comes in today with regards to ganglion on the dorsum of the right wrist   The patient reports that there is no pain  The swelling has been going on for many many years, but a year ago she had trauma and hit the back of her wrist, which caused a lot of pain and she has noticed increased swelling at that time  Dr Sangeeta Huertas aspirated this ganglion on 4-12-18, which took away some of the swelling, but it came back within a few days   The patient has no difficulty of ADLs, but has some mild discomfort with pouring her coffee  She also has numbness at night in both hands for many years, about 5, but has never been treated for this  She denies any issues with buttons, driving, or decreased strength  No EMG  She broke her right wrist as a little girl and it was casted  The patient has Aortic Valve Disease and PVC  Her cardiologist through [facility] in [place] and the last visit was 2 years ago  She reports having a stress test and 24 hours halter monitor and the patient reports that this was normal   She has occasional palpitations    She also has a history of GERD and is on Prilosec  She had a colonoscopy without any issues with anesthesia  She is not on any blood thinners  The following portions of the patient's history were reviewed and updated as appropriate: allergies, current medications, past family history, past social history, past surgical history and problem list           Social History     Social History    Marital status: /Civil Union     Spouse name: N/A    Number of children: N/A    Years of education: N/A     Occupational History    Not on file  Social History Main Topics    Smoking status: Never Smoker    Smokeless tobacco: Never Used    Alcohol use Yes      Comment: 2 drinks/night    Drug use: No    Sexual activity: Not on file     Other Topics Concern    Not on file     Social History Narrative    No narrative on file     Past Medical History:   Diagnosis Date    Family hx of colon cancer requiring screening colonoscopy     sister rectal ca,brother  colon cancer,sister breast cancer    GERD (gastroesophageal reflux disease)     High cholesterol     History of colon polyps     Osteoarthritis      Past Surgical History:   Procedure Laterality Date     SECTION      COLONOSCOPY N/A 2016    Procedure: COLONOSCOPY;  Surgeon: Cortney Sorensen MD;  [facility]; Service:     COLONOSCOPY W/ POLYPECTOMY      LA COLONOSCOPY FLX DX W/COLLJ SPEC WHEN PFRMD N/A 2017    Procedure: COLONOSCOPY;  Surgeon: Cortney Sorensen MD;  [facility]; Service: Colorectal    LA ESOPHAGOGASTRODUODENOSCOPY TRANSORAL DIAGNOSTIC N/A 2017    Procedure: ESOPHAGOGASTRODUODENOSCOPY (EGD); Surgeon: Reema Green MD;  [facility];   Service: Gastroenterology     Allergies   Allergen Reactions    Penicillins Hives     Current Outpatient Prescriptions on File Prior to Visit   Medication Sig Dispense Refill    Nutritional Supplements (VITAMIN D MAINTENANCE PO) Take 2,000 Units by mouth daily        omeprazole (PriLOSEC) 40 MG capsule Take 40 mg by mouth daily        rosuvastatin (CRESTOR) 5 mg tablet Take 5 mg by mouth daily   sodium chloride (OCEAN) 0 65 % nasal spray 1 spray into each nostril 4 (four) times a day as needed (rhinorrhea) 15 mL 0     No current facility-administered medications on file prior to visit  Objective:    Review of Systems   Constitutional: Negative for chills, fatigue and fever  HENT: Negative for congestion and sore throat  Eyes: Negative for pain and redness  Respiratory: Negative for cough, chest tightness and shortness of breath  Cardiovascular: Negative for chest pain and leg swelling  Gastrointestinal: Negative for abdominal pain, blood in stool, constipation, diarrhea, nausea and vomiting  Endocrine: Negative for polydipsia and polyuria  Genitourinary: Negative for frequency and hematuria  Musculoskeletal: Negative for back pain, myalgias and neck pain  + swelling on right wrist dorsum   Skin: Negative for rash  Neurological: Positive for numbness (hands bilaterally )  Negative for weakness, light-headedness and headaches  Hematological: Negative for adenopathy  Psychiatric/Behavioral: Negative for behavioral problems  The patient is not nervous/anxious  Ortho Exam    Physical Exam   Constitutional: She is oriented to person, place, and time  She appears well-developed and well-nourished  No distress  HENT:   Head: Normocephalic and atraumatic  Eyes: Conjunctivae and EOM are normal    Neck: Normal range of motion  Neck supple  Negative spurlings   Pulmonary/Chest: Effort normal  No respiratory distress  Musculoskeletal:   Right wrist dorsum: + ganglion measuring about 10 mm in diameter  Flexion and extension are slightly reduced when comparing contralateral side  Bilateral hands/wrists:  No thenar atrophy    Negative carpal tunnel compression and tinnels over median nerve at the wrist b/l  Negative Phelans  NVI  APB strength 5/5  Neurological: She is alert and oriented to person, place, and time  Skin: Skin is warm and dry  Psychiatric: She has a normal mood and affect  Vitals reviewed  Procedures  No Procedures performed today    No recent imaging to review  Portions of the record may have been created with voice recognition software   Occasional wrong word or "sound a like" substitutions may have occurred due to the inherent limitations of voice recognition software   Read the chart carefully and recognize, using context, where substitutions have occurred

## 2018-07-09 ENCOUNTER — HOSPITAL ENCOUNTER (OUTPATIENT)
Dept: RADIOLOGY | Facility: CLINIC | Age: 58
Discharge: HOME | End: 2018-07-09
Payer: COMMERCIAL

## 2018-07-09 DIAGNOSIS — R20.0 NUMBNESS AND TINGLING IN BOTH HANDS: ICD-10-CM

## 2018-07-09 DIAGNOSIS — R20.2 NUMBNESS AND TINGLING IN BOTH HANDS: ICD-10-CM

## 2018-07-09 PROCEDURE — 95885 MUSC TST DONE W/NERV TST LIM: CPT

## 2018-07-09 PROCEDURE — 95885 MUSC TST DONE W/NERV TST LIM: CPT | Performed by: PHYSICAL MEDICINE & REHABILITATION

## 2018-07-09 PROCEDURE — 95886 MUSC TEST DONE W/N TEST COMP: CPT

## 2018-07-09 PROCEDURE — 95886 MUSC TEST DONE W/N TEST COMP: CPT | Performed by: PHYSICAL MEDICINE & REHABILITATION

## 2018-07-09 PROCEDURE — 95910 NRV CNDJ TEST 7-8 STUDIES: CPT | Performed by: PHYSICAL MEDICINE & REHABILITATION

## 2018-07-09 NOTE — PROCEDURES
Procedures      Electromyogram and Nerve Conduction Velocity Procedure Note    HX:    26-year-old right-hand-dominant female referred by Orthopedic surgery for electrodiagnostic study of both upper extremities  She  Is describing log stated the symptoms of Dr Mansoor Cox right hand greater than left  There is no associated neck or shoulder pain no radicular symptoms are described no bowel or bladder incontinence is reported  PMH:   Hyperlipidemia    Exam:       Physical examination reveals a ganglion cyst on the dorsum of the right wrist there is no motor, sensory, or reflex changes in either upper extremity Tinel sign is unremarkable of both wrists and elbows  There is no deformity and no long tract signs, no fasciculations, tremors, or atrophy  Procedure:  Verbal informed consent was obtained as with all electrodiagnostic medicine patients  As with all patients this patient was informed that they may terminate the  examine at any time  Patient tolerated the procedure well with no adverse effects reported or observed  Findings:  Please see the [employer] data printout  The right median motor distal latency was prolonged 4 5 milliseconds with a normal amplitude, there is a significant conduction block demonstrated at the level of the wrist   The right median sensory distal latency was prolonged 4 5 milliseconds  The right ulnar sensory response with slightly reduced and there was mild slowing of the ulnar motor fibers at the level of the right elbow  Studies of the left median nerve including motor and sensory fibers were entirely normal, studies left ulnar sensory fibers were normal     Electromyography: Monopolar needle exploration failed to reveal any abnormal spontaneous potentials  In the following muscles:  Bilateral cervical paraspinals, bilateral deltoids, bilateral triceps, bilateral brachioradialis, left 1st dorsal interosseous, right abductor pollicis brevis    Motor units in all muscles were normal for amplitude, duration, and recruitment except in the right APB where was diminished to maximal effort  Conclusion:    1  There is evidence for focal slowing of the right median nerve at the level of the right wrist including motor and sensory fibers with partial conduction block of the motor fibers  These electrical findings supports clinical diagnosis of a moderate "carpal tunnel syndrome"  At this time left-sided median nerve function is entirely normal     There is no electrophysiologic data to indicated suggest a cervical radiculopathy, plexopathy, or large fiber polyneuropathy  3 Mild underlying ulnar neuropathy at the right elbow  Recommendations:         Careful clinical correlation is advised

## 2018-08-16 DIAGNOSIS — E78.01 FAMILIAL HYPERCHOLESTEROLEMIA: Primary | ICD-10-CM

## 2018-08-16 RX ORDER — ROSUVASTATIN CALCIUM 5 MG/1
TABLET, COATED ORAL
Qty: 90 TABLET | Refills: 1 | Status: SHIPPED | OUTPATIENT
Start: 2018-08-16 | End: 2019-06-18 | Stop reason: SDUPTHER

## 2018-10-29 ENCOUNTER — OFFICE VISIT (OUTPATIENT)
Dept: OBGYN CLINIC | Facility: CLINIC | Age: 58
End: 2018-10-29
Payer: COMMERCIAL

## 2018-10-29 VITALS
SYSTOLIC BLOOD PRESSURE: 135 MMHG | HEIGHT: 67 IN | BODY MASS INDEX: 31.39 KG/M2 | HEART RATE: 69 BPM | DIASTOLIC BLOOD PRESSURE: 81 MMHG | WEIGHT: 200 LBS

## 2018-10-29 DIAGNOSIS — M67.431 GANGLION CYST OF DORSUM OF RIGHT WRIST: ICD-10-CM

## 2018-10-29 DIAGNOSIS — G56.01 CARPAL TUNNEL SYNDROME, RIGHT: Primary | ICD-10-CM

## 2018-10-29 PROCEDURE — 99213 OFFICE O/P EST LOW 20 MIN: CPT | Performed by: ORTHOPAEDIC SURGERY

## 2018-10-29 NOTE — PROGRESS NOTES
Assessment:  1  Carpal tunnel syndrome, right     2  Ganglion cyst of dorsum of right wrist       Patient Active Problem List   Diagnosis    Actinic keratoses    Aortic valve disease    Hyperlipidemia    PVC (premature ventricular contraction)    Vitamin D deficiency    GERD without esophagitis    Ganglion cyst of dorsum of right wrist    Numbness and tingling in both hands    Ulnar neuropathy at elbow of right upper extremity    Carpal tunnel syndrome, right           Plan      Operative release of the carpal tunnel right side as well as the ganglion excision dorsal right  Subjective:     Patient ID:    Chief Complaint:Bianca Pena 62 y o  female      HPI     patient comes in with regards to her right wrist she was having carpal tunnel symptoms and also a ganglion cyst on the dorsal radial aspect of the wrist   She wanted to have the ganglion taking care but we did talk to her about getting carpal tunnel analysis for the possibility of doing 2 surgeries at the same time  She went and got the carpal tunnel examination done and comes back to review that  Of note the dorsal ganglion cyst has reoccurred and seems to be larger than it was before  The following portions of the patient's history were reviewed and updated as appropriate: allergies, current medications, past family history, past social history, past surgical history and problem list     All organ systems normal    Social History     Social History    Marital status: /Civil Union     Spouse name: N/A    Number of children: N/A    Years of education: N/A     Occupational History    Not on file       Social History Main Topics    Smoking status: Never Smoker    Smokeless tobacco: Never Used    Alcohol use Yes      Comment: 2 drinks/night    Drug use: No    Sexual activity: Not on file     Other Topics Concern    Not on file     Social History Narrative    No narrative on file     Past Medical History:   Diagnosis Date    Chalazion     Family hx of colon cancer requiring screening colonoscopy     sister rectal ca,brother  colon cancer,sister breast cancer    GERD (gastroesophageal reflux disease)     High cholesterol     History of colon polyps     Osteoarthritis     Palpitations      Past Surgical History:   Procedure Laterality Date     SECTION      COLONOSCOPY N/A 2016    Procedure: COLONOSCOPY;  Surgeon: Maddie Renteria MD;  [facility]; Service:     COLONOSCOPY W/ POLYPECTOMY      FL COLONOSCOPY FLX DX W/COLLJ SPEC WHEN PFRMD N/A 2017    Procedure: COLONOSCOPY;  Surgeon: Maddie Renteria MD;  [facility]; Service: Colorectal    FL ESOPHAGOGASTRODUODENOSCOPY TRANSORAL DIAGNOSTIC N/A 2017    Procedure: ESOPHAGOGASTRODUODENOSCOPY (EGD); Surgeon: Sheri Hudson MD;  [facility]; Service: Gastroenterology    RECTAL POLYPECTOMY      last assessed:  14     Allergies   Allergen Reactions    Penicillins Hives     Current Outpatient Prescriptions on File Prior to Visit   Medication Sig Dispense Refill    Nutritional Supplements (VITAMIN D MAINTENANCE PO) Take 2,000 Units by mouth daily        omeprazole (PriLOSEC) 40 MG capsule Take 40 mg by mouth daily        rosuvastatin (CRESTOR) 5 mg tablet TAKE 1 TABLET DAILY  90 tablet 1    [DISCONTINUED] sodium chloride (OCEAN) 0 65 % nasal spray 1 spray into each nostril 4 (four) times a day as needed (rhinorrhea) 15 mL 0     No current facility-administered medications on file prior to visit  Objective:        Ortho Exam    Examination of the wrist shows a dorsal radial ganglion cyst no erythema no ecchymosis  No pulse associated with it is approximately 1 cm in size or in diameter  Sensation to light touch is intact to work ends test is negative Tinel's test is negative    EMG is positive for moderate carpal tunnel      I have personally reviewed pertinent films in PACS and my interpretation is EMG shows moderate carpal tunnel involving the motor and sensory branches of the right wrist   The left wrist shows numbers with in normal range but high norm  Portions of the record may have been created with voice recognition software   Occasional wrong word or "sound a like" substitutions may have occurred due to the inherent limitations of voice recognition software   Read the chart carefully and recognize, using context, where substitutions have occurred

## 2018-11-01 ENCOUNTER — APPOINTMENT (OUTPATIENT)
Dept: LAB | Facility: CLINIC | Age: 58
End: 2018-11-01
Payer: COMMERCIAL

## 2018-11-01 DIAGNOSIS — M67.431 GANGLION CYST OF DORSUM OF RIGHT WRIST: ICD-10-CM

## 2018-11-01 DIAGNOSIS — G56.01 CARPAL TUNNEL SYNDROME, RIGHT: ICD-10-CM

## 2018-11-01 LAB
ALBUMIN SERPL BCP-MCNC: 3.6 G/DL (ref 3.5–5)
ALP SERPL-CCNC: 94 U/L (ref 46–116)
ALT SERPL W P-5'-P-CCNC: 38 U/L (ref 12–78)
ANION GAP SERPL CALCULATED.3IONS-SCNC: 11 MMOL/L (ref 4–13)
AST SERPL W P-5'-P-CCNC: 21 U/L (ref 5–45)
ATRIAL RATE: 54 BPM
BASOPHILS # BLD AUTO: 0.02 THOUSANDS/ΜL (ref 0–0.1)
BASOPHILS NFR BLD AUTO: 0 % (ref 0–1)
BILIRUB SERPL-MCNC: 0.4 MG/DL (ref 0.2–1)
BILIRUB UR QL STRIP: NEGATIVE
BUN SERPL-MCNC: 15 MG/DL (ref 5–25)
CALCIUM SERPL-MCNC: 9.3 MG/DL (ref 8.3–10.1)
CHLORIDE SERPL-SCNC: 104 MMOL/L (ref 100–108)
CLARITY UR: CLEAR
CO2 SERPL-SCNC: 27 MMOL/L (ref 21–32)
COLOR UR: YELLOW
CREAT SERPL-MCNC: 0.96 MG/DL (ref 0.6–1.3)
EOSINOPHIL # BLD AUTO: 0.06 THOUSAND/ΜL (ref 0–0.61)
EOSINOPHIL NFR BLD AUTO: 1 % (ref 0–6)
ERYTHROCYTE [DISTWIDTH] IN BLOOD BY AUTOMATED COUNT: 12.7 % (ref 11.6–15.1)
GFR SERPL CREATININE-BSD FRML MDRD: 65 ML/MIN/1.73SQ M
GLUCOSE SERPL-MCNC: 100 MG/DL (ref 65–140)
GLUCOSE UR STRIP-MCNC: NEGATIVE MG/DL
HCT VFR BLD AUTO: 41.4 % (ref 34.8–46.1)
HGB BLD-MCNC: 14.1 G/DL (ref 11.5–15.4)
HGB UR QL STRIP.AUTO: NEGATIVE
IMM GRANULOCYTES # BLD AUTO: 0.01 THOUSAND/UL (ref 0–0.2)
IMM GRANULOCYTES NFR BLD AUTO: 0 % (ref 0–2)
KETONES UR STRIP-MCNC: NEGATIVE MG/DL
LEUKOCYTE ESTERASE UR QL STRIP: NEGATIVE
LYMPHOCYTES # BLD AUTO: 2.32 THOUSANDS/ΜL (ref 0.6–4.47)
LYMPHOCYTES NFR BLD AUTO: 40 % (ref 14–44)
MCH RBC QN AUTO: 31.1 PG (ref 26.8–34.3)
MCHC RBC AUTO-ENTMCNC: 34.1 G/DL (ref 31.4–37.4)
MCV RBC AUTO: 91 FL (ref 82–98)
MONOCYTES # BLD AUTO: 0.46 THOUSAND/ΜL (ref 0.17–1.22)
MONOCYTES NFR BLD AUTO: 8 % (ref 4–12)
NEUTROPHILS # BLD AUTO: 2.91 THOUSANDS/ΜL (ref 1.85–7.62)
NEUTS SEG NFR BLD AUTO: 51 % (ref 43–75)
NITRITE UR QL STRIP: NEGATIVE
NRBC BLD AUTO-RTO: 0 /100 WBCS
P AXIS: 45 DEGREES
PH UR STRIP.AUTO: 5.5 [PH] (ref 4.5–8)
PLATELET # BLD AUTO: 205 THOUSANDS/UL (ref 149–390)
PMV BLD AUTO: 10.2 FL (ref 8.9–12.7)
POTASSIUM SERPL-SCNC: 3.7 MMOL/L (ref 3.5–5.3)
PR INTERVAL: 144 MS
PROT SERPL-MCNC: 7.9 G/DL (ref 6.4–8.2)
PROT UR STRIP-MCNC: NEGATIVE MG/DL
QRS AXIS: 12 DEGREES
QRSD INTERVAL: 82 MS
QT INTERVAL: 448 MS
QTC INTERVAL: 424 MS
RBC # BLD AUTO: 4.53 MILLION/UL (ref 3.81–5.12)
SODIUM SERPL-SCNC: 142 MMOL/L (ref 136–145)
SP GR UR STRIP.AUTO: >=1.03 (ref 1–1.03)
T WAVE AXIS: 26 DEGREES
UROBILINOGEN UR QL STRIP.AUTO: 0.2 E.U./DL
VENTRICULAR RATE: 54 BPM
WBC # BLD AUTO: 5.78 THOUSAND/UL (ref 4.31–10.16)

## 2018-11-01 PROCEDURE — 80053 COMPREHEN METABOLIC PANEL: CPT

## 2018-11-01 PROCEDURE — 93010 ELECTROCARDIOGRAM REPORT: CPT | Performed by: INTERNAL MEDICINE

## 2018-11-01 PROCEDURE — 93005 ELECTROCARDIOGRAM TRACING: CPT | Performed by: ORTHOPAEDIC SURGERY

## 2018-11-01 PROCEDURE — 85025 COMPLETE CBC W/AUTO DIFF WBC: CPT

## 2018-11-01 PROCEDURE — 36415 COLL VENOUS BLD VENIPUNCTURE: CPT

## 2018-11-01 PROCEDURE — 81003 URINALYSIS AUTO W/O SCOPE: CPT | Performed by: ORTHOPAEDIC SURGERY

## 2018-11-02 ENCOUNTER — TELEPHONE (OUTPATIENT)
Dept: UROLOGY | Facility: MEDICAL CENTER | Age: 58
End: 2018-11-02

## 2018-11-02 NOTE — PRE-PROCEDURE INSTRUCTIONS
Pre-Surgery Instructions:   Medication Instructions    Nutritional Supplements (VITAMIN D MAINTENANCE PO) Instructed patient per Anesthesia Guidelines   omeprazole (PriLOSEC) 40 MG capsule Patient was instructed by Physician and understands   rosuvastatin (CRESTOR) 5 mg tablet Instructed patient per Anesthesia Guidelines  Pre op and bathing instructions reviewed  Pt has hibiclens

## 2018-11-02 NOTE — TELEPHONE ENCOUNTER
Reason for appointment/Complaint/Diagnosis : Urinary Leakage    Insurance: CBC    History of Cancer? No                     If yes, what kind? N/A    Previous urologist?  No                Records requested/where? No    Outside testing/where? No    Location Preference for office visit?  [facility]

## 2018-11-05 ENCOUNTER — OFFICE VISIT (OUTPATIENT)
Dept: FAMILY MEDICINE CLINIC | Facility: CLINIC | Age: 58
End: 2018-11-05
Payer: COMMERCIAL

## 2018-11-05 VITALS
TEMPERATURE: 99.5 F | HEART RATE: 74 BPM | RESPIRATION RATE: 18 BRPM | WEIGHT: 204 LBS | DIASTOLIC BLOOD PRESSURE: 78 MMHG | HEIGHT: 67 IN | SYSTOLIC BLOOD PRESSURE: 118 MMHG | BODY MASS INDEX: 32.02 KG/M2

## 2018-11-05 DIAGNOSIS — G56.01 CARPAL TUNNEL SYNDROME, RIGHT: ICD-10-CM

## 2018-11-05 DIAGNOSIS — I35.0 NONRHEUMATIC AORTIC VALVE STENOSIS: ICD-10-CM

## 2018-11-05 DIAGNOSIS — K21.9 GERD WITHOUT ESOPHAGITIS: ICD-10-CM

## 2018-11-05 DIAGNOSIS — E78.2 MIXED HYPERLIPIDEMIA: ICD-10-CM

## 2018-11-05 DIAGNOSIS — E55.9 VITAMIN D DEFICIENCY: ICD-10-CM

## 2018-11-05 DIAGNOSIS — Z01.818 PRE-OP EXAMINATION: Primary | ICD-10-CM

## 2018-11-05 DIAGNOSIS — M67.431 GANGLION CYST OF DORSUM OF RIGHT WRIST: ICD-10-CM

## 2018-11-05 PROBLEM — R20.0 NUMBNESS AND TINGLING IN BOTH HANDS: Status: RESOLVED | Noted: 2018-05-10 | Resolved: 2018-11-05

## 2018-11-05 PROBLEM — R20.2 NUMBNESS AND TINGLING IN BOTH HANDS: Status: RESOLVED | Noted: 2018-05-10 | Resolved: 2018-11-05

## 2018-11-05 PROCEDURE — 99214 OFFICE O/P EST MOD 30 MIN: CPT | Performed by: FAMILY MEDICINE

## 2018-11-05 PROCEDURE — 3008F BODY MASS INDEX DOCD: CPT | Performed by: FAMILY MEDICINE

## 2018-11-05 RX ORDER — FAMOTIDINE 20 MG/1
20 TABLET, FILM COATED ORAL 2 TIMES DAILY PRN
Qty: 60 TABLET | Refills: 0 | Status: SHIPPED | OUTPATIENT
Start: 2018-11-05 | End: 2018-12-05 | Stop reason: SDUPTHER

## 2018-11-05 NOTE — PROGRESS NOTES
PRE-OPERATIVE EVALUATION  [facility]    NAME: Dandy Schmitz  AGE: 62 y o  SEX: female  : 1960     DATE: 2018    Family Medicine Pre-Operative Evaluation      Chief Complaint: Pre-operative Evaluation     Surgery: Right carpal surgery, right ganglion cyst surgery  Anticipated Date of Surgery: 18  Referring Provider: Dr Kim Drummond     History of Present Illness:     Dandy Schmitz is a 62 y o  female who presents to the office today for a preoperative consultation at the request of the surgeon for the procedure above  Current anti-platelet/anti-coagulation medications that the patient is prescribed includes: none         Assessment of Chronic Conditions:   - None  Assessment of Cardiac Risk:  · Denies unstable or severe angina or MI in the last 6 weeks or history of stent placement in the last year   · Denies decompensated heart failure (e g  New onset heart failure, NYHA functional class IV heart failure, or worsening existing heart failure)  · Denies significant arrhythmias such as high grade AV block, symptomatic ventricular arrhythmia, newly recognized ventricular tachycardia, supraventricular tachycardia with resting heart rate >100, or symptomatic bradycardia  · Denies severe heart valve disease including aortic stenosis or symptomatic mitral stenosis, has mild aortic stenosis     Exercise Capacity:  · Able to walk 4 blocks without symptoms?: Yes  · Able to walk 2 flights without symptoms?: Yes    Prior Anesthesia Reactions: No     Personal history of venous thromboembolic disease? No    Review of Systems:     Review of Systems   Constitutional: Negative for chills and fever  Respiratory: Negative for cough and shortness of breath  Cardiovascular: Negative for chest pain and palpitations  Musculoskeletal: Positive for arthralgias         Current Problem List:     Patient Active Problem List   Diagnosis    Actinic keratoses    Nonrheumatic aortic valve stenosis    Mixed hyperlipidemia    Vitamin D deficiency    GERD without esophagitis    Ganglion cyst of dorsum of right wrist    Ulnar neuropathy at elbow of right upper extremity    Carpal tunnel syndrome, right       Allergies: Allergies   Allergen Reactions    Penicillins Hives       Medications:       Current Outpatient Prescriptions:     Nutritional Supplements (VITAMIN D MAINTENANCE PO), Take 2,000 Units by mouth daily  , Disp: , Rfl:     rosuvastatin (CRESTOR) 5 mg tablet, TAKE 1 TABLET DAILY  , Disp: 90 tablet, Rfl: 1    famotidine (PEPCID) 20 mg tablet, Take 1 tablet (20 mg total) by mouth 2 (two) times a day as needed for heartburn for up to 30 days, Disp: 60 tablet, Rfl: 0    Past Medical History:       Past Medical History:   Diagnosis Date    Chalazion     Family hx of colon cancer requiring screening colonoscopy     sister rectal ca,brother  colon cancer,sister breast cancer    GERD (gastroesophageal reflux disease)     High cholesterol     History of colon polyps     Osteoarthritis     Palpitations         Past Surgical History:   Procedure Laterality Date     SECTION      x2    COLONOSCOPY N/A 2016    Procedure: COLONOSCOPY;  Surgeon: Netta Monroe MD;  [facility]; Service:     COLONOSCOPY W/ POLYPECTOMY      OVARIAN CYST REMOVAL      WI COLONOSCOPY FLX DX W/COLLJ SPEC WHEN PFRMD N/A 2017    Procedure: COLONOSCOPY;  Surgeon: Netta Monroe MD;  [facility]; Service: Colorectal    WI ESOPHAGOGASTRODUODENOSCOPY TRANSORAL DIAGNOSTIC N/A 2017    Procedure: ESOPHAGOGASTRODUODENOSCOPY (EGD); Surgeon: Katy Lutz MD;  [facility];   Service: Gastroenterology    RECTAL POLYPECTOMY      last assessed:  14    TONSILLECTOMY          Family History   Problem Relation Age of Onset    Stroke Mother         stroke syndrome    Stroke Father         stroke syndrome    Diabetes type II Father     Breast cancer Sister     Rectal cancer Sister     Colon cancer Brother         Social History     Social History    Marital status: /Civil Union     Spouse name: N/A    Number of children: N/A    Years of education: N/A     Occupational History    Not on file  Social History Main Topics    Smoking status: Never Smoker    Smokeless tobacco: Never Used    Alcohol use Yes      Comment: 2 drinks/night    Drug use: No    Sexual activity: Not on file     Other Topics Concern    Not on file     Social History Narrative    No narrative on file        Physical Exam:     /78 (BP Location: Left arm, Patient Position: Sitting, Cuff Size: Large)   Pulse 74   Temp 99 5 °F (37 5 °C)   Resp 18   Ht 5' 7" (1 702 m)   Wt 92 5 kg (204 lb)   Breastfeeding? No   BMI 31 95 kg/m²     Physical Exam   Constitutional: She is oriented to person, place, and time  She appears well-developed and well-nourished  No distress  obese   HENT:   Head: Normocephalic and atraumatic  Mouth/Throat: Oropharynx is clear and moist  No oropharyngeal exudate  Eyes: Pupils are equal, round, and reactive to light  EOM are normal  Right eye exhibits no discharge  Left eye exhibits no discharge  Neck: Normal range of motion  Neck supple  No JVD present  Cardiovascular: Normal rate and regular rhythm  Murmur heard  Systolic murmur is present with a grade of 1/6   Pulmonary/Chest: Effort normal and breath sounds normal  No stridor  No respiratory distress  She has no wheezes  Abdominal: Soft  Bowel sounds are normal  There is no tenderness  There is no rebound and no guarding  Musculoskeletal: Normal range of motion  She exhibits no edema or tenderness  Arms:  Neurological: She is alert and oriented to person, place, and time  Skin: Skin is warm and dry  She is not diaphoretic  No erythema  Psychiatric: She has a normal mood and affect  Her behavior is normal    Vitals reviewed         Data:     Pre-operative work-up    Laboratory Results: I have personally reviewed the pertinent laboratory results/reports   Lab Results   Component Value Date    CREATININE 0 96 11/01/2018     Appointment on 11/01/2018   Component Date Value Ref Range Status    Sodium 11/01/2018 142  136 - 145 mmol/L Final    Potassium 11/01/2018 3 7  3 5 - 5 3 mmol/L Final    Chloride 11/01/2018 104  100 - 108 mmol/L Final    CO2 11/01/2018 27  21 - 32 mmol/L Final    ANION GAP 11/01/2018 11  4 - 13 mmol/L Final    BUN 11/01/2018 15  5 - 25 mg/dL Final    Creatinine 11/01/2018 0 96  0 60 - 1 30 mg/dL Final    Standardized to IDMS reference method    Glucose 11/01/2018 100  65 - 140 mg/dL Final      If the patient is fasting, the ADA then defines impaired fasting glucose as > 100 mg/dL and diabetes as > or equal to 123 mg/dL  Specimen collection should occur prior to Sulfasalazine administration due to the potential for falsely depressed results  Specimen collection should occur prior to Sulfapyridine administration due to the potential for falsely elevated results   Calcium 11/01/2018 9 3  8 3 - 10 1 mg/dL Final    AST 11/01/2018 21  5 - 45 U/L Final      Specimen collection should occur prior to Sulfasalazine administration due to the potential for falsely depressed results   ALT 11/01/2018 38  12 - 78 U/L Final      Specimen collection should occur prior to Sulfasalazine administration due to the potential for falsely depressed results       Alkaline Phosphatase 11/01/2018 94  46 - 116 U/L Final    Total Protein 11/01/2018 7 9  6 4 - 8 2 g/dL Final    Albumin 11/01/2018 3 6  3 5 - 5 0 g/dL Final    Total Bilirubin 11/01/2018 0 40  0 20 - 1 00 mg/dL Final    eGFR 11/01/2018 65  ml/min/1 73sq m Final    WBC 11/01/2018 5 78  4 31 - 10 16 Thousand/uL Final    RBC 11/01/2018 4 53  3 81 - 5 12 Million/uL Final    Hemoglobin 11/01/2018 14 1  11 5 - 15 4 g/dL Final    Hematocrit 11/01/2018 41 4  34 8 - 46 1 % Final    MCV 11/01/2018 91  82 - 98 fL Final  MCH 11/01/2018 31 1  26 8 - 34 3 pg Final    MCHC 11/01/2018 34 1  31 4 - 37 4 g/dL Final    RDW 11/01/2018 12 7  11 6 - 15 1 % Final    MPV 11/01/2018 10 2  8 9 - 12 7 fL Final    Platelets 60/05/1349 205  149 - 390 Thousands/uL Final    nRBC 11/01/2018 0  /100 WBCs Final    Neutrophils Relative 11/01/2018 51  43 - 75 % Final    Immat GRANS % 11/01/2018 0  0 - 2 % Final    Lymphocytes Relative 11/01/2018 40  14 - 44 % Final    Monocytes Relative 11/01/2018 8  4 - 12 % Final    Eosinophils Relative 11/01/2018 1  0 - 6 % Final    Basophils Relative 11/01/2018 0  0 - 1 % Final    Neutrophils Absolute 11/01/2018 2 91  1 85 - 7 62 Thousands/µL Final    Immature Grans Absolute 11/01/2018 0 01  0 00 - 0 20 Thousand/uL Final    Lymphocytes Absolute 11/01/2018 2 32  0 60 - 4 47 Thousands/µL Final    Monocytes Absolute 11/01/2018 0 46  0 17 - 1 22 Thousand/µL Final    Eosinophils Absolute 11/01/2018 0 06  0 00 - 0 61 Thousand/µL Final    Basophils Absolute 11/01/2018 0 02  0 00 - 0 10 Thousands/µL Final       EKG: I have personally reviewed pertinent reports  NSR @ 54bpm with nonspecific T wave abnormality, asymptomatic, f/u after surgery  Assessment & Recommendations:     Pre-Op Evaluation Assessment  Cardiac Risk Estimation: per the Revised Cardiac Risk Index (Circ  100:1043, 1999), the patient's risk factors for cardiac complications include none, putting her in: RCI RISK CLASS I (0 risk factors, risk of major cardiac compl  appr  0 5%)  Sara Huber is undergoing an elective Low Risk surgery  They are at [address] risk for major adverse cardiac event (MACE)  They may proceed with surgery as planned without further workup  Pre-Op Evaluation Plan  1  Further preoperative workup as follows:   - None; no further preoperative work-up is required    2   Medication Management/Recommendations:   - Patient has been instructed to avoid herbs or non-directed vitamins the week prior to surgery to ensure no drug interactions with perioperative surgical and anesthetic medications  - Patient should continue his statin medication up through and including the day of surgery  3  Patient requires further consultation with: None    4  Other Problems Addressed Today:  Mixed hyperlipidemia  Last Lipid Panel:  Lab Results   Component Value Date    CHOL 171 11/04/2015    HDL 42 09/22/2017    LDLCALC 114 (H) 09/22/2017    TRIG 166 (H) 09/22/2017     -Recheck FLP   -Continue Rosuvastatin 5mg    Nonrheumatic aortic valve stenosis  Mild aortic stenosis on echo in 2015    GERD without esophagitis  Is taking Omeprazole 40mg PRN and did EGD in Dec 2017 that was normal by Dr Caron Goodrich , but had a small hiatal hernia  -Change to Pepcid BID PRN after surgery       Mariely Tomlinson   [address] [place] [id number]  Phone#  [phone]  Fax#  [phone]

## 2018-11-05 NOTE — ASSESSMENT & PLAN NOTE
Is taking Omeprazole 40mg PRN and did EGD in Dec 2017 that was normal by Dr Meseret Blanco , but had a small hiatal hernia  -Change to Pepcid BID PRN after surgery

## 2018-11-05 NOTE — ASSESSMENT & PLAN NOTE
Last Lipid Panel:  Lab Results   Component Value Date    CHOL 171 11/04/2015    HDL 42 09/22/2017    LDLCALC 114 (H) 09/22/2017    TRIG 166 (H) 09/22/2017     -Recheck FLP   -Continue Rosuvastatin 5mg

## 2018-11-12 ENCOUNTER — ANESTHESIA EVENT (OUTPATIENT)
Dept: PERIOP | Facility: HOSPITAL | Age: 58
End: 2018-11-12
Payer: COMMERCIAL

## 2018-11-13 ENCOUNTER — ANESTHESIA (OUTPATIENT)
Dept: PERIOP | Facility: HOSPITAL | Age: 58
End: 2018-11-13
Payer: COMMERCIAL

## 2018-11-13 ENCOUNTER — HOSPITAL ENCOUNTER (OUTPATIENT)
Facility: HOSPITAL | Age: 58
Setting detail: OUTPATIENT SURGERY
Discharge: HOME | End: 2018-11-13
Attending: ORTHOPAEDIC SURGERY | Admitting: ORTHOPAEDIC SURGERY
Payer: COMMERCIAL

## 2018-11-13 VITALS
HEART RATE: 70 BPM | OXYGEN SATURATION: 97 % | BODY MASS INDEX: 31.39 KG/M2 | RESPIRATION RATE: 18 BRPM | DIASTOLIC BLOOD PRESSURE: 70 MMHG | TEMPERATURE: 97.9 F | WEIGHT: 200 LBS | SYSTOLIC BLOOD PRESSURE: 141 MMHG | HEIGHT: 67 IN

## 2018-11-13 DIAGNOSIS — M67.431 GANGLION CYST OF DORSUM OF RIGHT WRIST: Primary | ICD-10-CM

## 2018-11-13 DIAGNOSIS — G56.01 CARPAL TUNNEL SYNDROME, RIGHT: ICD-10-CM

## 2018-11-13 PROCEDURE — 25111 REMOVE WRIST TENDON LESION: CPT | Performed by: ORTHOPAEDIC SURGERY

## 2018-11-13 PROCEDURE — 64721 CARPAL TUNNEL SURGERY: CPT | Performed by: ORTHOPAEDIC SURGERY

## 2018-11-13 RX ORDER — FENTANYL CITRATE/PF 50 MCG/ML
25 SYRINGE (ML) INJECTION
Status: DISCONTINUED | OUTPATIENT
Start: 2018-11-13 | End: 2018-11-13 | Stop reason: HOSPADM

## 2018-11-13 RX ORDER — PROPOFOL 10 MG/ML
INJECTION, EMULSION INTRAVENOUS AS NEEDED
Status: DISCONTINUED | OUTPATIENT
Start: 2018-11-13 | End: 2018-11-13 | Stop reason: SURG

## 2018-11-13 RX ORDER — ONDANSETRON 2 MG/ML
4 INJECTION INTRAMUSCULAR; INTRAVENOUS ONCE AS NEEDED
Status: COMPLETED | OUTPATIENT
Start: 2018-11-13 | End: 2018-11-13

## 2018-11-13 RX ORDER — LIDOCAINE HYDROCHLORIDE 10 MG/ML
INJECTION, SOLUTION INFILTRATION; PERINEURAL AS NEEDED
Status: DISCONTINUED | OUTPATIENT
Start: 2018-11-13 | End: 2018-11-13 | Stop reason: SURG

## 2018-11-13 RX ORDER — SODIUM CHLORIDE 9 MG/ML
125 INJECTION, SOLUTION INTRAVENOUS CONTINUOUS
Status: DISCONTINUED | OUTPATIENT
Start: 2018-11-13 | End: 2018-11-13 | Stop reason: HOSPADM

## 2018-11-13 RX ORDER — ONDANSETRON 2 MG/ML
4 INJECTION INTRAMUSCULAR; INTRAVENOUS EVERY 6 HOURS PRN
Status: DISCONTINUED | OUTPATIENT
Start: 2018-11-13 | End: 2018-11-13 | Stop reason: HOSPADM

## 2018-11-13 RX ORDER — BUPIVACAINE HYDROCHLORIDE 2.5 MG/ML
INJECTION, SOLUTION INFILTRATION; PERINEURAL AS NEEDED
Status: DISCONTINUED | OUTPATIENT
Start: 2018-11-13 | End: 2018-11-13 | Stop reason: HOSPADM

## 2018-11-13 RX ORDER — OXYCODONE HYDROCHLORIDE 5 MG/1
10 TABLET ORAL EVERY 4 HOURS PRN
Status: DISCONTINUED | OUTPATIENT
Start: 2018-11-13 | End: 2018-11-13 | Stop reason: HOSPADM

## 2018-11-13 RX ORDER — FENTANYL CITRATE 50 UG/ML
INJECTION, SOLUTION INTRAMUSCULAR; INTRAVENOUS AS NEEDED
Status: DISCONTINUED | OUTPATIENT
Start: 2018-11-13 | End: 2018-11-13 | Stop reason: SURG

## 2018-11-13 RX ORDER — OXYCODONE HYDROCHLORIDE 5 MG/1
5 TABLET ORAL EVERY 4 HOURS PRN
Status: DISCONTINUED | OUTPATIENT
Start: 2018-11-13 | End: 2018-11-13 | Stop reason: HOSPADM

## 2018-11-13 RX ORDER — OXYCODONE HYDROCHLORIDE AND ACETAMINOPHEN 5; 325 MG/1; MG/1
1 TABLET ORAL EVERY 6 HOURS PRN
Qty: 7 TABLET | Refills: 0 | Status: SHIPPED | OUTPATIENT
Start: 2018-11-13 | End: 2018-12-07 | Stop reason: ALTCHOICE

## 2018-11-13 RX ORDER — MORPHINE SULFATE 4 MG/ML
2 INJECTION, SOLUTION INTRAMUSCULAR; INTRAVENOUS EVERY 4 HOURS PRN
Status: DISCONTINUED | OUTPATIENT
Start: 2018-11-13 | End: 2018-11-13 | Stop reason: HOSPADM

## 2018-11-13 RX ORDER — SODIUM CHLORIDE 9 MG/ML
50 INJECTION, SOLUTION INTRAVENOUS CONTINUOUS
Status: CANCELLED | OUTPATIENT
Start: 2018-11-13

## 2018-11-13 RX ORDER — OMEPRAZOLE 40 MG/1
40 CAPSULE, DELAYED RELEASE ORAL DAILY
COMMUNITY
End: 2019-07-15 | Stop reason: SDUPTHER

## 2018-11-13 RX ADMIN — FENTANYL CITRATE 50 MCG: 50 INJECTION INTRAMUSCULAR; INTRAVENOUS at 11:45

## 2018-11-13 RX ADMIN — SODIUM CHLORIDE: 0.9 INJECTION, SOLUTION INTRAVENOUS at 11:29

## 2018-11-13 RX ADMIN — FENTANYL CITRATE 50 MCG: 50 INJECTION INTRAMUSCULAR; INTRAVENOUS at 11:35

## 2018-11-13 RX ADMIN — LIDOCAINE HYDROCHLORIDE 50 MG: 10 INJECTION, SOLUTION INFILTRATION; PERINEURAL at 11:31

## 2018-11-13 RX ADMIN — PROPOFOL 200 MG: 10 INJECTION, EMULSION INTRAVENOUS at 11:31

## 2018-11-13 RX ADMIN — ONDANSETRON 4 MG: 2 INJECTION INTRAMUSCULAR; INTRAVENOUS at 11:36

## 2018-11-13 RX ADMIN — CEFAZOLIN SODIUM 2000 MG: 2 SOLUTION INTRAVENOUS at 11:29

## 2018-11-13 RX ADMIN — OXYCODONE HYDROCHLORIDE 10 MG: 5 TABLET ORAL at 13:18

## 2018-11-13 RX ADMIN — DEXAMETHASONE SODIUM PHOSPHATE 4 MG: 10 INJECTION INTRAMUSCULAR; INTRAVENOUS at 11:36

## 2018-11-13 NOTE — DISCHARGE INSTRUCTIONS
Discharge Instructions:    Weight bearing:  Do not put direct pressure over the incision  Do not lift with this hand until postoperative appointment  Dressings:  Keep the dressings clean, dry, and intact for 3 days  May remove dressings after 3 days and shower  Please clean the incision with alcohol after showers until postoperative appointment  May apply band-aids as needed  DO NOT SOAK THE INCISION                              Pain:  You have been prescribed a narcotic  Please take this sparingly and only AS NEEDED for pain  Appt Instructions: If you do not have your appointment, please call the clinic at [phone] to f/u with Dr Puja Rodas in 2 weeks from date of surgery

## 2018-11-13 NOTE — ANESTHESIA PREPROCEDURE EVALUATION
Review of Systems/Medical History          Cardiovascular  EKG reviewed, Hyperlipidemia, Valvular problems/murmurs: MILd  ,   Comment: EF-60-65%-2015,  Pulmonary  Not a smoker ,        GI/Hepatic    GERD well controlled,             Endo/Other    Obesity    GYN       Hematology   Musculoskeletal    Arthritis     Neurology   Psychology           Physical Exam    Airway    Mallampati score: I  TM Distance: >3 FB  Neck ROM: full     Dental       Cardiovascular      Pulmonary      Other Findings        Anesthesia Plan  ASA Score- 2     Anesthesia Type- general with ASA Monitors  Additional Monitors:   Airway Plan: LMA  Plan Factors-Patient not instructed to abstain from smoking on day of procedure  Patient did not smoke on day of surgery  Induction- rapid sequence induction and intravenous  Postoperative Plan-     Informed Consent- Anesthetic plan and risks discussed with patient  I personally reviewed this patient with the CRNA  Discussed and agreed on the Anesthesia Plan with the CRNA           Lab Results   Component Value Date    GLUC 100 11/01/2018    ALT 38 11/01/2018    AST 21 11/01/2018    BUN 15 11/01/2018    CALCIUM 9 3 11/01/2018     11/01/2018    CHOL 171 11/04/2015    CO2 27 11/01/2018    CREATININE 0 96 11/01/2018    HDL 42 09/22/2017    HCT 41 4 11/01/2018    HGB 14 1 11/01/2018    PROT 7 6 11/04/2015    HGBA1C 5 6 09/22/2017     11/01/2018    K 3 7 11/01/2018     11/04/2015    TRIG 166 (H) 09/22/2017    WBC 5 78 11/01/2018

## 2018-11-13 NOTE — ANESTHESIA POSTPROCEDURE EVALUATION
Post-Op Assessment Note      CV Status:  Stable    Mental Status:  Alert and awake    Hydration Status:  Euvolemic    PONV Controlled:  Controlled    Airway Patency:  Patent    Post Op Vitals Reviewed: Yes          Staff: CRNA       Comments: vss sv nonobstructed uneventful          BP   119/66   Temp   97 7   Pulse  82   Resp 18   SpO2 98

## 2018-11-13 NOTE — OP NOTE
OPERATIVE REPORT  PATIENT NAME: Fred Duke    :  1960  MRN: [id number]  Pt Location: AN OR ROOM 01    SURGERY DATE: 2018    Surgeon(s) and Role:     DO Norma Em Primary    Preop Diagnosis:  Carpal tunnel syndrome, right [G56 01]  Ganglion cyst of dorsum of right wrist [M67 431]    Post-Op Diagnosis Codes:     * Carpal tunnel syndrome, right [G56 01]     * Ganglion cyst of dorsum of right wrist [M67 431]    Procedure(s) (LRB):  CARPAL TUNNEL RELEASE (Right)  WRIST GANGLION CYST EXCISION (Right)    Specimen(s):  * No specimens in log *    Estimated Blood Loss:   Minimal    Drains:       Anesthesia Type:   General    Operative Indications:  Carpal tunnel syndrome, right [G56 01]  Ganglion cyst of dorsum of right wrist [M67 431]      Operative Findings:  Large ganglion cyst extending from the radial carpal joint  Thickened transverse carpal ligament    Complications:   None    Procedure and Technique:  Patient was seen and examined in the preoperative area  The right upper extremity was marked, the consent an H&P had been reviewed  The patient was brought back to the operative suite  The patient was intubated sedated  Tourniquet was applied to right upper extremity  The right  upper extremity was prepped and draped in a sterile fashion  After proper timeout commenced and identified the right upper extremity as the operative site  Esmarch was utilized to exsanguinate the extremity, the tourniquet was turned up to 250 mmHg  Incision was made in reference with Daniels's cardinal lines  Bovie was used to electrocauterize any bleeding  We used Metzenbaum scissors to dissect down to the carpal tunnel ligament  We used a 10 blade to incise into the carpal tunnel ligament and used a Bolt elevator to protect the median nerve while dissecting proximally and distally the carpal tunnel ligament, while releasing the carpal tunnel ligament  We confirmed proper release      We then moved to the dorsal ganglion  We made a chevron incision over the dorsal aspect of the wrist  The ganglion cyst was tenting the skin so once we got through the dermis were able to identify the ganglion cyst   We made sure to dissect around the ganglion cyst there were superficial nerves on top of an incorporating into the cyst   We were able to protect these  We dissected down found that the ganglion cyst went over multiple extensor tendons and extended out from underneath the extensor retinaculum  Or able dissect down Tod stocking excise it from its stalk  We closed and irrigated the area with 0 Vicryl  We then closed the subcutaneous tissue with 2 O Vicryl and then nylon was utilized on the skin  We irrigated wound the tourniquet was taken down after 25 minutes  Bleeding was cauterized incision was closed with 3 O nylon  Xeroform was applied to the skin a cut 4 x 4 and a Tegaderm were applied to the hand the patient was taken back to PACU after anesthesia was reversed     I was present for the entire procedure     I was present for the entire procedure and A qualified resident physician was not available    Patient Disposition:  PACU     SIGNATURE: Lizette Stinson DO  DATE: November 13, 2018  TIME: 11:19 AM

## 2018-11-13 NOTE — H&P (VIEW-ONLY)
Assessment:  1  Carpal tunnel syndrome, right     2  Ganglion cyst of dorsum of right wrist       Patient Active Problem List   Diagnosis    Actinic keratoses    Aortic valve disease    Hyperlipidemia    PVC (premature ventricular contraction)    Vitamin D deficiency    GERD without esophagitis    Ganglion cyst of dorsum of right wrist    Numbness and tingling in both hands    Ulnar neuropathy at elbow of right upper extremity    Carpal tunnel syndrome, right           Plan      Operative release of the carpal tunnel right side as well as the ganglion excision dorsal right  Subjective:     Patient ID:    Chief Complaint:Bianca MORALES Junior Evener 62 y o  female      HPI     patient comes in with regards to her right wrist she was having carpal tunnel symptoms and also a ganglion cyst on the dorsal radial aspect of the wrist   She wanted to have the ganglion taking care but we did talk to her about getting carpal tunnel analysis for the possibility of doing 2 surgeries at the same time  She went and got the carpal tunnel examination done and comes back to review that  Of note the dorsal ganglion cyst has reoccurred and seems to be larger than it was before  The following portions of the patient's history were reviewed and updated as appropriate: allergies, current medications, past family history, past social history, past surgical history and problem list     All organ systems normal    Social History     Social History    Marital status: /Civil Union     Spouse name: N/A    Number of children: N/A    Years of education: N/A     Occupational History    Not on file       Social History Main Topics    Smoking status: Never Smoker    Smokeless tobacco: Never Used    Alcohol use Yes      Comment: 2 drinks/night    Drug use: No    Sexual activity: Not on file     Other Topics Concern    Not on file     Social History Narrative    No narrative on file     Past Medical History:   Diagnosis Date    Chalazion     Family hx of colon cancer requiring screening colonoscopy     sister rectal ca,brother  colon cancer,sister breast cancer    GERD (gastroesophageal reflux disease)     High cholesterol     History of colon polyps     Osteoarthritis     Palpitations      Past Surgical History:   Procedure Laterality Date     SECTION      COLONOSCOPY N/A 2016    Procedure: COLONOSCOPY;  Surgeon: Netta Monroe MD;  [facility]; Service:     COLONOSCOPY W/ POLYPECTOMY      [place] COLONOSCOPY FLX DX W/COLLJ SPEC WHEN PFRMD N/A 2017    Procedure: COLONOSCOPY;  Surgeon: Netta Monroe MD;  [facility]; Service: Colorectal    [place] ESOPHAGOGASTRODUODENOSCOPY TRANSORAL DIAGNOSTIC N/A 2017    Procedure: ESOPHAGOGASTRODUODENOSCOPY (EGD); Surgeon: Katy Lutz MD;  [facility]; Service: Gastroenterology    RECTAL POLYPECTOMY      last assessed:  14     Allergies   Allergen Reactions    Penicillins Hives     Current Outpatient Prescriptions on File Prior to Visit   Medication Sig Dispense Refill    Nutritional Supplements (VITAMIN D MAINTENANCE PO) Take 2,000 Units by mouth daily        omeprazole (PriLOSEC) 40 MG capsule Take 40 mg by mouth daily        rosuvastatin (CRESTOR) 5 mg tablet TAKE 1 TABLET DAILY  90 tablet 1    [DISCONTINUED] sodium chloride (OCEAN) 0 65 % nasal spray 1 spray into each nostril 4 (four) times a day as needed (rhinorrhea) 15 mL 0     No current facility-administered medications on file prior to visit  Objective:        Ortho Exam    Examination of the wrist shows a dorsal radial ganglion cyst no erythema no ecchymosis  No pulse associated with it is approximately 1 cm in size or in diameter  Sensation to light touch is intact to work ends test is negative Tinel's test is negative    EMG is positive for moderate carpal tunnel      I have personally reviewed pertinent films in PACS and my interpretation is EMG shows moderate carpal tunnel involving the motor and sensory branches of the right wrist   The left wrist shows numbers with in normal range but high norm  Portions of the record may have been created with voice recognition software   Occasional wrong word or "sound a like" substitutions may have occurred due to the inherent limitations of voice recognition software   Read the chart carefully and recognize, using context, where substitutions have occurred

## 2018-11-26 ENCOUNTER — OFFICE VISIT (OUTPATIENT)
Dept: OBGYN CLINIC | Facility: CLINIC | Age: 58
End: 2018-11-26

## 2018-11-26 VITALS — DIASTOLIC BLOOD PRESSURE: 80 MMHG | SYSTOLIC BLOOD PRESSURE: 129 MMHG | HEART RATE: 72 BPM

## 2018-11-26 DIAGNOSIS — M67.431 GANGLION CYST OF DORSUM OF RIGHT WRIST: ICD-10-CM

## 2018-11-26 DIAGNOSIS — G56.01 CARPAL TUNNEL SYNDROME, RIGHT: Primary | ICD-10-CM

## 2018-11-26 PROCEDURE — 99024 POSTOP FOLLOW-UP VISIT: CPT | Performed by: PHYSICIAN ASSISTANT

## 2018-11-26 NOTE — PROGRESS NOTES
Assessment:   Status post right dorsal ganglion cyst excision and open right carpal tunnel release on  11/13/2018, doing well    Plan:   scar tissue massage    Activities  As tolerated    Follow Up:  2  week(s)    To Do Next Visit:    as long as no issues or concerns return fully to activities tolerated      CHIEF COMPLAINT:  Chief Complaint   Patient presents with    Right Wrist - Post-op         SUBJECTIVE:  Erasmo Montana is a 62y o  year old female who presents for follow up after  right dorsal ganglion cyst excision and open right carpal tunnel release   Today patient has  Very mild pain discomfort at the incision sites, reports no more numbness or tingling         PHYSICAL EXAMINATION:  General: well developed and well nourished, alert, oriented times 3 and appears comfortable  Psychiatric: Normal    MUSCULOSKELETAL EXAMINATION:  Incision: healed  Range of Motion: As expected and full composite fist possible  Neurovascular status: Neuro intact, good cap refill  Activity Restrictions: No restrictions  Done today: Sutures out      STUDIES REVIEWED:  No Studies to review      PROCEDURES PERFORMED:  Procedures  No Procedures performed today 2017

## 2018-11-30 ENCOUNTER — APPOINTMENT (OUTPATIENT)
Dept: LAB | Facility: MEDICAL CENTER | Age: 58
End: 2018-11-30
Payer: COMMERCIAL

## 2018-11-30 DIAGNOSIS — E78.2 MIXED HYPERLIPIDEMIA: ICD-10-CM

## 2018-11-30 DIAGNOSIS — E55.9 VITAMIN D DEFICIENCY: ICD-10-CM

## 2018-11-30 LAB
25(OH)D3 SERPL-MCNC: 28.7 NG/ML (ref 30–100)
CHOLEST SERPL-MCNC: 217 MG/DL (ref 50–200)
HDLC SERPL-MCNC: 41 MG/DL (ref 40–60)
LDLC SERPL CALC-MCNC: 134 MG/DL (ref 0–100)
TRIGL SERPL-MCNC: 209 MG/DL

## 2018-11-30 PROCEDURE — 36415 COLL VENOUS BLD VENIPUNCTURE: CPT

## 2018-11-30 PROCEDURE — 80061 LIPID PANEL: CPT

## 2018-11-30 PROCEDURE — 82306 VITAMIN D 25 HYDROXY: CPT

## 2018-12-05 DIAGNOSIS — K21.9 GERD WITHOUT ESOPHAGITIS: ICD-10-CM

## 2018-12-05 RX ORDER — FAMOTIDINE 20 MG/1
20 TABLET, FILM COATED ORAL 2 TIMES DAILY PRN
Qty: 180 TABLET | Refills: 0 | Status: SHIPPED | OUTPATIENT
Start: 2018-12-05 | End: 2019-07-15 | Stop reason: ALTCHOICE

## 2018-12-07 ENCOUNTER — OFFICE VISIT (OUTPATIENT)
Dept: FAMILY MEDICINE CLINIC | Facility: CLINIC | Age: 58
End: 2018-12-07
Payer: COMMERCIAL

## 2018-12-07 VITALS
HEIGHT: 66 IN | WEIGHT: 202 LBS | TEMPERATURE: 97.1 F | BODY MASS INDEX: 32.47 KG/M2 | SYSTOLIC BLOOD PRESSURE: 118 MMHG | DIASTOLIC BLOOD PRESSURE: 80 MMHG | HEART RATE: 80 BPM | RESPIRATION RATE: 14 BRPM

## 2018-12-07 DIAGNOSIS — E66.09 CLASS 1 OBESITY DUE TO EXCESS CALORIES WITH SERIOUS COMORBIDITY AND BODY MASS INDEX (BMI) OF 32.0 TO 32.9 IN ADULT: ICD-10-CM

## 2018-12-07 DIAGNOSIS — E78.2 MIXED HYPERLIPIDEMIA: ICD-10-CM

## 2018-12-07 DIAGNOSIS — I49.3 PVC (PREMATURE VENTRICULAR CONTRACTION): ICD-10-CM

## 2018-12-07 DIAGNOSIS — E55.9 VITAMIN D DEFICIENCY: Primary | ICD-10-CM

## 2018-12-07 DIAGNOSIS — K21.9 GERD WITHOUT ESOPHAGITIS: ICD-10-CM

## 2018-12-07 DIAGNOSIS — I35.0 NONRHEUMATIC AORTIC VALVE STENOSIS: ICD-10-CM

## 2018-12-07 PROBLEM — E66.811 CLASS 1 OBESITY DUE TO EXCESS CALORIES WITH SERIOUS COMORBIDITY AND BODY MASS INDEX (BMI) OF 32.0 TO 32.9 IN ADULT: Status: ACTIVE | Noted: 2018-12-07

## 2018-12-07 PROCEDURE — 3008F BODY MASS INDEX DOCD: CPT | Performed by: FAMILY MEDICINE

## 2018-12-07 PROCEDURE — 99214 OFFICE O/P EST MOD 30 MIN: CPT | Performed by: FAMILY MEDICINE

## 2018-12-07 NOTE — PROGRESS NOTES
FAMILY MEDICINE PROGRESS NOTE  Kat Quiñones 62 y o  female   DATE: December 7, 2018     ASSESSMENT and PLAN:  Kat Quiñones is a 62 y o  female with:     Vitamin D deficiency  Slightly low at 28 7, start Vitamin D 1,000 IU    Mixed hyperlipidemia  Last Lipid Panel:  Lab Results   Component Value Date    CHOL 171 11/04/2015    HDL 41 11/30/2018    LDLCALC 134 (H) 11/30/2018    TRIG 209 (H) 11/30/2018     The 10-year ASCVD risk score (Frances Vasquez et al , 2013) is: 3 1%    Values used to calculate the score:      Age: 62 years      Sex: Female      Is Non- : No      Diabetic: No      Tobacco smoker: No      Systolic Blood Pressure: 123 mmHg      Is BP treated: No      HDL Cholesterol: 41 mg/dL      Total Cholesterol: 217 mg/dL    Continue Rosuvastatin 5mg, encouraged compliance    Class 1 obesity due to excess calories with serious comorbidity and body mass index (BMI) of 32 0 to 32 9 in adult  Wt Readings from Last 3 Encounters:   12/07/18 91 6 kg (202 lb)   11/13/18 90 7 kg (200 lb)   11/05/18 92 5 kg (204 lb)     BMI Counseling: Body mass index is 32 36 kg/m²  Discussed the patient's BMI with her  The BMI is above average  BMI counseling and education was provided to the patient  Nutrition recommendations include reducing portion sizes, decreasing overall calorie intake, 3-5 servings of fruits/vegetables daily, consuming healthier snacks, decreasing soda and/or juice intake, moderation in carbohydrate intake and reducing intake of cholesterol  Exercise recommendations include moderate aerobic physical activity for 150 minutes/week and exercising 3-5 times per week  Nonrheumatic aortic valve stenosis  Mild aortic stenosis on echo in 2015    PVC (premature ventricular contraction)  Patient is having more frequent palpitations, she had a Holter approximately 3 years ago that showed that she had less than 2% PVCs    She had seen electrophysiology, Dr Carolyn Babinski, in the past, had offered a beta-blocker given she was symptomatic, patient declined at that time  But given that it is more frequent will check a 48 hr Holter, patient will return to discuss results after she gets the testing done  GERD without esophagitis  Patient is trying to do Pepcid instead of Prilosec, but still noticing rebound reflux symptoms, advised dietary and lifestyle modifications with portion control  SUBJECTIVE:  Fred Duke is a 62 y o  female who presents today with a chief complaint of Hyperlipidemia (follow up of labs)  Pt is here for f/u of blood work  In the meantime she had surgery done on her right for her    1  HLD- uncontrolled, on medications, but is not very compliant  2  Vit D- low, but hasnt been on supplements lately  3  GERD- not doing well with just H2 blocker  4  Obesity- is starting a diet in January  5  Palpitations- had a stress test in 2016  Has been having more palpitations, almost every 2 days      Review of Systems   Constitutional: Positive for fatigue  Respiratory: Negative for shortness of breath  Cardiovascular: Positive for palpitations  Negative for chest pain  I have reviewed the patient's PMH, Social History, Medication List and Allergies as appropriate  OBJECTIVE:  /80 (BP Location: Left arm, Patient Position: Sitting, Cuff Size: Standard)   Pulse 80   Temp (!) 97 1 °F (36 2 °C)   Resp 14   Ht 5' 6 25" (1 683 m)   Wt 91 6 kg (202 lb)   Breastfeeding? No   BMI 32 36 kg/m²    Physical Exam   Constitutional: She appears well-developed and well-nourished  No distress  obese   Cardiovascular: Normal rate, regular rhythm and normal heart sounds  Pulmonary/Chest: Effort normal and breath sounds normal  No respiratory distress  She has no wheezes  She has no rales  Skin: She is not diaphoretic  Vitals reviewed          Appointment on 11/30/2018   Component Date Value Ref Range Status    Cholesterol 11/30/2018 217* 50 - 200 mg/dL Final      Cholesterol:     Desirable         <200 mg/dl       Borderline         200-239 mg/dl       High              >239           Triglycerides 11/30/2018 209* <=150 mg/dL Final      Triglyceride:     Normal          <150 mg/dl     Borderline High 150-199 mg/dl     High            200-499 mg/dl        Very High       >499 mg/dl    Specimen collection should occur prior to N-Acetylcysteine or Metamizole administration due to the potential for falsely depressed results   HDL, Direct 11/30/2018 41  40 - 60 mg/dL Final      HDL Cholesterol:       High    >60 mg/dL       Low     <41 mg/dL  Specimen collection should occur prior to Metamizole administration due to the potential for falsley depressed results   LDL Calculated 11/30/2018 134* 0 - 100 mg/dL Final      LDL Cholesterol:     Optimal           <100 mg/dl     Near Optimal      100-129 mg/dl     Above Optimal       Borderline High 130-159 mg/dl       High            160-189 mg/dl       Very High       >189 mg/dl         This screening LDL is a calculated result  It does not have the accuracy of the Direct Measured LDL in the monitoring of patients with hyperlipidemia and/or statin therapy  Direct Measure LDL (MPN169) must be ordered separately in these patients   Vit D, 25-Hydroxy 11/30/2018 28 7* 30 0 - 100 0 ng/mL Final       Gold Lowe MD    Note: Portions of the record may have been created with voice recognition software  Occasional wrong word or "sound a like" substitutions may have occurred due to the inherent limitations of voice recognition software  Read the chart carefully and recognize, using context, where substitutions have occurred

## 2018-12-07 NOTE — ASSESSMENT & PLAN NOTE
Wt Readings from Last 3 Encounters:   12/07/18 91 6 kg (202 lb)   11/13/18 90 7 kg (200 lb)   11/05/18 92 5 kg (204 lb)     BMI Counseling: Body mass index is 32 36 kg/m²  Discussed the patient's BMI with her  The BMI is above average  BMI counseling and education was provided to the patient  Nutrition recommendations include reducing portion sizes, decreasing overall calorie intake, 3-5 servings of fruits/vegetables daily, consuming healthier snacks, decreasing soda and/or juice intake, moderation in carbohydrate intake and reducing intake of cholesterol  Exercise recommendations include moderate aerobic physical activity for 150 minutes/week and exercising 3-5 times per week

## 2018-12-07 NOTE — ASSESSMENT & PLAN NOTE
Patient is trying to do Pepcid instead of Prilosec, but still noticing rebound reflux symptoms, advised dietary and lifestyle modifications with portion control

## 2018-12-07 NOTE — ASSESSMENT & PLAN NOTE
Patient is having more frequent palpitations, she had a Holter approximately 3 years ago that showed that she had less than 2% PVCs  She had seen electrophysiology, Dr Wu Every, in the past, had offered a beta-blocker given she was symptomatic, patient declined at that time  But given that it is more frequent will check a 48 hr Holter, patient will return to discuss results after she gets the testing done

## 2018-12-07 NOTE — ASSESSMENT & PLAN NOTE
Last Lipid Panel:  Lab Results   Component Value Date    CHOL 171 11/04/2015    HDL 41 11/30/2018    LDLCALC 134 (H) 11/30/2018    TRIG 209 (H) 11/30/2018     The 10-year ASCVD risk score (Kate Aldana et al , 2013) is: 3 1%    Values used to calculate the score:      Age: 62 years      Sex: Female      Is Non- : No      Diabetic: No      Tobacco smoker: No      Systolic Blood Pressure: 087 mmHg      Is BP treated: No      HDL Cholesterol: 41 mg/dL      Total Cholesterol: 217 mg/dL    Continue Rosuvastatin 5mg, encouraged compliance

## 2018-12-13 ENCOUNTER — OFFICE VISIT (OUTPATIENT)
Dept: OBGYN CLINIC | Facility: MEDICAL CENTER | Age: 58
End: 2018-12-13

## 2018-12-13 VITALS — BODY MASS INDEX: 32.47 KG/M2 | HEIGHT: 66 IN | WEIGHT: 202 LBS

## 2018-12-13 DIAGNOSIS — G56.01 CARPAL TUNNEL SYNDROME ON RIGHT: Primary | ICD-10-CM

## 2018-12-13 PROCEDURE — 99024 POSTOP FOLLOW-UP VISIT: CPT | Performed by: ORTHOPAEDIC SURGERY

## 2018-12-13 NOTE — PROGRESS NOTES
Assessment:  1  Carpal tunnel syndrome on right  Ambulatory referral to Occupational Therapy     Patient Active Problem List   Diagnosis    Actinic keratoses    Nonrheumatic aortic valve stenosis    Mixed hyperlipidemia    PVC (premature ventricular contraction)    Vitamin D deficiency    GERD without esophagitis    Ganglion cyst of dorsum of right wrist    Ulnar neuropathy at elbow of right upper extremity    Carpal tunnel syndrome, right    Class 1 obesity due to excess calories with serious comorbidity and body mass index (BMI) of 32 0 to 32 9 in adult           Plan      Follow-up in 6 weeks after occupational therapy to assess how the scar tissue as improved and sensation  Subjective:     Patient ID:    Chief Complaint:Bianca Arreola 62 y o  female      HPI    Patient comes in today now 4 weeks out after having carpal tunnel surgery as well as an excision of ganglion cyst on the dorsum of her wrist   She doing rather well her numbness is resolved but she does have her hand falls asleep at night  She also has some soreness in or around the scar tissue region of the carpal tunnel release  Social History     Social History    Marital status: /Civil Union     Spouse name: N/A    Number of children: N/A    Years of education: N/A     Occupational History    Not on file       Social History Main Topics    Smoking status: Never Smoker    Smokeless tobacco: Never Used    Alcohol use Yes      Comment: 2 drinks/night    Drug use: No    Sexual activity: Not on file     Other Topics Concern    Not on file     Social History Narrative    No narrative on file     Past Medical History:   Diagnosis Date    Chalazion     Family hx of colon cancer requiring screening colonoscopy     sister rectal ca,brother  colon cancer,sister breast cancer    GERD (gastroesophageal reflux disease)     High cholesterol     History of colon polyps     Osteoarthritis     Palpitations Past Surgical History:   Procedure Laterality Date     SECTION      x2    COLONOSCOPY N/A 2016    Procedure: COLONOSCOPY;  Surgeon: Jerry Patel MD;  [facility]; Service:     COLONOSCOPY W/ POLYPECTOMY      OVARIAN CYST REMOVAL      [place] COLONOSCOPY FLX DX W/COLLJ SPEC WHEN PFRMD N/A 2017    Procedure: COLONOSCOPY;  Surgeon: Jerry Patel MD;  [facility]; Service: Colorectal    [place] ESOPHAGOGASTRODUODENOSCOPY TRANSORAL DIAGNOSTIC N/A 2017    Procedure: ESOPHAGOGASTRODUODENOSCOPY (EGD); Surgeon: Ana Davis MD;  [facility]; Service: Gastroenterology    AZ EXCIS PRIMARY GANGLION WRIST Right 2018    Procedure: WRIST GANGLION CYST EXCISION;  Surgeon: Arvind Feliciano DO;  [facility];  Service: Orthopedics    AZ REVISE MEDIAN N/CARPAL TUNNEL SURG Right 2018    Procedure: CARPAL TUNNEL RELEASE;  Surgeon: Arvind Feliciano DO;  [facility];  Service: Orthopedics    RECTAL POLYPECTOMY      last assessed:  14    TONSILLECTOMY       Allergies   Allergen Reactions    Penicillins Hives     Current Outpatient Prescriptions on File Prior to Visit   Medication Sig Dispense Refill    famotidine (PEPCID) 20 mg tablet Take 1 tablet (20 mg total) by mouth 2 (two) times a day as needed for heartburn 180 tablet 0    Nutritional Supplements (VITAMIN D MAINTENANCE PO) Take 2,000 Units by mouth daily        omeprazole (PriLOSEC) 40 MG capsule Take 40 mg by mouth daily      rosuvastatin (CRESTOR) 5 mg tablet TAKE 1 TABLET DAILY  90 tablet 1     No current facility-administered medications on file prior to visit                 Objective:    Review of Systems    Ortho Exam    Physical Exam  Incision is healing mildly tender over the medial aspect of the scar the dorsal scar is healing as well as no real fluctuance mild edema no erythema and bony prominences evident

## 2018-12-19 ENCOUNTER — EVALUATION (OUTPATIENT)
Dept: OCCUPATIONAL THERAPY | Facility: MEDICAL CENTER | Age: 58
End: 2018-12-19
Payer: COMMERCIAL

## 2018-12-19 DIAGNOSIS — G56.01 CARPAL TUNNEL SYNDROME ON RIGHT: ICD-10-CM

## 2018-12-19 PROCEDURE — G8984 CARRY CURRENT STATUS: HCPCS | Performed by: OCCUPATIONAL THERAPIST

## 2018-12-19 PROCEDURE — 97165 OT EVAL LOW COMPLEX 30 MIN: CPT | Performed by: OCCUPATIONAL THERAPIST

## 2018-12-19 PROCEDURE — G8985 CARRY GOAL STATUS: HCPCS | Performed by: OCCUPATIONAL THERAPIST

## 2018-12-19 PROCEDURE — 97530 THERAPEUTIC ACTIVITIES: CPT | Performed by: OCCUPATIONAL THERAPIST

## 2018-12-19 NOTE — PROGRESS NOTES
OT Evaluation     Today's date: 2018  Patient name: Analy Rashid  : 1960  MRN: [id number]  Referring provider: Regla Li DO  Dx:   Encounter Diagnosis     ICD-10-CM    1  Carpal tunnel syndrome on right G56 01 Ambulatory referral to Occupational Therapy       Start Time: 1705  Stop Time: 1745  Total time in clinic (min): 40 minutes    Assessment  Assessment details: 61 yo woman rhd presents post R CTR and ganglion cyst removal    Patient would benefit from skilled OT to remediate scar tissue issues and increase strength  Educated re: the healing process of CTR  Issued elastomer and K tape over cyst and surgical site  Impairments: impaired physical strength and lacks appropriate home exercise program  Understanding of Dx/Px/POC: good   Prognosis: good    Plan  Patient would benefit from: custom splinting, OT eval and skilled occupational therapy  Planned modality interventions: ultrasound  Planned therapy interventions: manual therapy, massage, orthotic fitting/training, neuromuscular re-education, strengthening, therapeutic activities, therapeutic exercise and home exercise program  Frequency: 1x week  Duration in weeks: 6  Treatment plan discussed with: patient        Subjective Evaluation    History of Present Illness  Date of surgery: 2018  Mechanism of injury: surgery  Mechanism of injury: R CTR and ganglion cyst removal at the same time  Currently presesnts with scar tissue problems  Patient states she can't push off from her right hand and notes that if she "hits her hand wrong" she can feel it      Not a recurrent problem   Quality of life: good    Pain  Current pain ratin  At best pain ratin  At worst pain ratin  Location: palm at incision site  Quality: radiating  Progression: worsening          Objective     Strength/Myotome Testing     Left Wrist/Hand      (2nd hand position)     Trial 1: 60    Trial 2: 60    Trial 3: 63    Thumb Strength  Key/Lateral Pinch Trail 1: 18  Tip/Two-Point Pinch     Trial 1: 13  Palmar/Three-Point Pinch     Trial 1: 15    Right Wrist/Hand      (2nd hand position)     Trial 1: 40    Trial 2: 40    Trial 3: 41    Thumb Strength   Key/Lateral Pinch     Trial 1: 18  Tip/Two-Point Pinch     Trial 1: 13 5  Palmar/Three-Point Pinch     Trial 1: 14 5    Additional Strength Details  [place] mikael  l digit 2,4,5:  Normal sensation, 1,3 diminished light touch  R digit 3 5 normal sensaion, 1,2,4 diminished light touch      Flowsheet Rows      Most Recent Value   PT/OT G-Codes   Current Score  63   Projected Score  78   FOTO information reviewed  Yes   Assessment Type  Evaluation   G code set  Carrying, Moving & Handling Objects   Carrying, Moving and Handling Objects Current Status ()  CJ   Carrying, Moving and Handling Objects Goal Status ()  CJ          Precautions:  NA    Daily Treatment Diary     Manual  12/19            Scar tissue massage  HEP                                      Elastomer  CT scar            K TAPE Over cyst                Exercise Diary              Static  HEP            Isometric wrist HEP                                                                                                                                                                                                                                                          Modalities

## 2018-12-26 ENCOUNTER — OFFICE VISIT (OUTPATIENT)
Dept: OCCUPATIONAL THERAPY | Facility: MEDICAL CENTER | Age: 58
End: 2018-12-26
Payer: COMMERCIAL

## 2018-12-26 DIAGNOSIS — G56.01 CARPAL TUNNEL SYNDROME ON RIGHT: Primary | ICD-10-CM

## 2018-12-26 PROCEDURE — 97110 THERAPEUTIC EXERCISES: CPT | Performed by: OCCUPATIONAL THERAPIST

## 2018-12-26 PROCEDURE — 97140 MANUAL THERAPY 1/> REGIONS: CPT | Performed by: OCCUPATIONAL THERAPIST

## 2018-12-26 NOTE — PROGRESS NOTES
Daily Note / D/C Summary    Today's date: 2018  Patient name: Jay Fothergill  : 1960  MRN: [id number]  Referring provider: Kannan Dominguez DO  Dx:   Encounter Diagnosis     ICD-10-CM    1  Carpal tunnel syndrome on right G56 01                   Subjective:  " If I  Grab the broom I get a weird sensation but it's definitely better "      Objective: See treatment diary below  Manual             Scar tissue massage  HEP iastm  X 15'                                     Elastomer  CT scar reviewed/ trial over            K TAPE Over cyst                Exercise Diary              Static  HEP            Isometric wrist HEP            tp yellow fd, and pinches   HEP                                                                                                                                                                                                                                            Modalities                                                       Patient particpated in 2/2 visits  She was progressing towards goals  Assessment: Tolerated treatment well  Patient would benefit from continued OT  Patient feels elastomer is helping and HEP is also helpful  Issued tp for HEP this date  Plan: Patient self discharged due to financial reasons

## 2019-01-24 ENCOUNTER — OFFICE VISIT (OUTPATIENT)
Dept: OBGYN CLINIC | Facility: MEDICAL CENTER | Age: 59
End: 2019-01-24

## 2019-01-24 VITALS
HEART RATE: 88 BPM | BODY MASS INDEX: 32.47 KG/M2 | DIASTOLIC BLOOD PRESSURE: 85 MMHG | SYSTOLIC BLOOD PRESSURE: 126 MMHG | WEIGHT: 202 LBS | HEIGHT: 66 IN

## 2019-01-24 DIAGNOSIS — G56.01 CARPAL TUNNEL SYNDROME, RIGHT: Primary | ICD-10-CM

## 2019-01-24 PROCEDURE — 99024 POSTOP FOLLOW-UP VISIT: CPT | Performed by: ORTHOPAEDIC SURGERY

## 2019-01-24 NOTE — PROGRESS NOTES
Assessment:  1  Carpal tunnel syndrome, right       Patient Active Problem List   Diagnosis    Actinic keratoses    Nonrheumatic aortic valve stenosis    Mixed hyperlipidemia    PVC (premature ventricular contraction)    Vitamin D deficiency    GERD without esophagitis    Ganglion cyst of dorsum of right wrist    Ulnar neuropathy at elbow of right upper extremity    Carpal tunnel syndrome, right    Class 1 obesity due to excess calories with serious comorbidity and body mass index (BMI) of 32 0 to 32 9 in adult         Plan      Due to patient doing so well following surgery we will follow up at on an as needed basis  Patient was advised to call the office if she has any questions or concerns  Subjective:    Chief Complaint:Bianca Hung 62 y o  female      HPI    The patient presents to the office 10 weeks status post right open carpal tunnel surgery as well as excision of ganglion cyst on the dorsum of her right wrist   Patient states she is doing well  Patient states that she has resolution of numbness and tingling  Patient does have some pillar pain but it is improving the scar tissues also proving no falling asleep at night anymore  The dorsal aspect of the wrist where we did a dorsal ganglion cyst in has improved she still has a bony prominence due to arthritic changes      The following portions of the patient's history were reviewed and updated as appropriate: allergies, current medications, past family history, past social history, past surgical history and problem list     All organ systems normal    Social History     Social History    Marital status: /Civil Union     Spouse name: N/A    Number of children: N/A    Years of education: N/A     Occupational History    Not on file       Social History Main Topics    Smoking status: Never Smoker    Smokeless tobacco: Never Used    Alcohol use Yes      Comment: 2 drinks/night    Drug use: No    Sexual activity: Not on file Other Topics Concern    Not on file     Social History Narrative    No narrative on file     Past Medical History:   Diagnosis Date    Chalazion     Family hx of colon cancer requiring screening colonoscopy     sister rectal ca,brother  colon cancer,sister breast cancer    GERD (gastroesophageal reflux disease)     High cholesterol     History of colon polyps     Osteoarthritis     Palpitations      Past Surgical History:   Procedure Laterality Date     SECTION      x2    COLONOSCOPY N/A 2016    Procedure: COLONOSCOPY;  Surgeon: Josafat Feliz MD;  [facility]; Service:     COLONOSCOPY W/ POLYPECTOMY      OVARIAN CYST REMOVAL      GA COLONOSCOPY FLX DX W/COLLJ SPEC WHEN PFRMD N/A 2017    Procedure: COLONOSCOPY;  Surgeon: Josafat Feliz MD;  [facility]; Service: Colorectal    GA ESOPHAGOGASTRODUODENOSCOPY TRANSORAL DIAGNOSTIC N/A 2017    Procedure: ESOPHAGOGASTRODUODENOSCOPY (EGD); Surgeon: Ambreen Maldonado MD;  [facility];   Service: Gastroenterology    GA EXCIS PRIMARY GANGLION WRIST Right 2018    Procedure: WRIST GANGLION CYST EXCISION;  Surgeon: Elly Ryder DO;  [facility];  Service: Orthopedics    GA REVISE MEDIAN N/CARPAL TUNNEL SURG Right 2018    Procedure: CARPAL TUNNEL RELEASE;  Surgeon: Elly Ryder DO;  [facility];  Service: Orthopedics    RECTAL POLYPECTOMY      last assessed:  14    TONSILLECTOMY       Allergies   Allergen Reactions    Penicillins Hives     Current Outpatient Prescriptions on File Prior to Visit   Medication Sig Dispense Refill    famotidine (PEPCID) 20 mg tablet Take 1 tablet (20 mg total) by mouth 2 (two) times a day as needed for heartburn 180 tablet 0    Nutritional Supplements (VITAMIN D MAINTENANCE PO) Take 2,000 Units by mouth daily        omeprazole (PriLOSEC) 40 MG capsule Take 40 mg by mouth daily      rosuvastatin (CRESTOR) 5 mg tablet TAKE 1 TABLET DAILY  90 tablet 1     No current facility-administered medications on file prior to visit  Objective:        Right Hand Exam     Comments:  Well-healed incisions  No swelling erythema or ecchymosis  No drainage  Mildly tender over the ulnar aspect of her hand                 no images were reviewed today    Scribe Attestation    I,:    am acting as a scribe while in the presence of the attending physician :        I,:    personally performed the services described in this documentation    as scribed in my presence :              Portions of the record may have been created with voice recognition software   Occasional wrong word or "sound a like" substitutions may have occurred due to the inherent limitations of voice recognition software   Read the chart carefully and recognize, using context, where substitutions have occurred

## 2019-03-19 ENCOUNTER — ANNUAL EXAM (OUTPATIENT)
Dept: OBGYN CLINIC | Facility: CLINIC | Age: 59
End: 2019-03-19
Payer: COMMERCIAL

## 2019-03-19 VITALS
BODY MASS INDEX: 31.55 KG/M2 | HEIGHT: 67 IN | WEIGHT: 201 LBS | SYSTOLIC BLOOD PRESSURE: 114 MMHG | DIASTOLIC BLOOD PRESSURE: 60 MMHG

## 2019-03-19 DIAGNOSIS — Z01.419 ENCOUNTER FOR ANNUAL ROUTINE GYNECOLOGICAL EXAMINATION: ICD-10-CM

## 2019-03-19 DIAGNOSIS — Z12.4 ROUTINE CERVICAL SMEAR: ICD-10-CM

## 2019-03-19 DIAGNOSIS — Z11.51 SCREENING FOR HPV (HUMAN PAPILLOMAVIRUS): ICD-10-CM

## 2019-03-19 DIAGNOSIS — Z12.39 SCREENING FOR BREAST CANCER: Primary | ICD-10-CM

## 2019-03-19 PROCEDURE — S0610 ANNUAL GYNECOLOGICAL EXAMINA: HCPCS | Performed by: OBSTETRICS & GYNECOLOGY

## 2019-03-19 NOTE — PROGRESS NOTES
Assessment/Plan:    No problem-specific Assessment & Plan notes found for this encounter  Diagnoses and all orders for this visit:    Screening for breast cancer  -     Mammo screening bilateral w cad; Future    Routine cervical smear  -     GP Pap Dependent HPV Cotest >= 27years old    Screening for HPV (human papillomavirus)  -     GP Pap Dependent HPV Cotest >= 27years old          Subjective:      Patient ID: Elizabeth Murray is a 62 y o  female  Pt presents for her annual GYN visit  Gyn issues, bleeding or cramping  No breast concerns  Last pap 8/2017 - pap done today    LMP 1/2012      The following portions of the patient's history were reviewed and updated as appropriate: allergies, current medications, past family history, past medical history, past social history, past surgical history and problem list     Review of Systems      Objective:      /60 (BP Location: Right arm, Patient Position: Sitting, Cuff Size: Standard)   Ht 5' 7" (1 702 m)   Wt 91 2 kg (201 lb)   LMP  (LMP Unknown)   BMI 31 48 kg/m²          Physical Exam   Constitutional: She is oriented to person, place, and time  She appears well-developed and well-nourished  Neck: Normal range of motion  Neck supple  No tracheal deviation present  No thyromegaly present  Cardiovascular: Normal rate, regular rhythm and normal heart sounds  Pulmonary/Chest: Effort normal  No stridor  No respiratory distress  She has no wheezes  She has no rales  She exhibits no tenderness  Right breast exhibits no inverted nipple, no mass, no nipple discharge, no skin change and no tenderness  Left breast exhibits no inverted nipple, no mass, no nipple discharge, no skin change and no tenderness  No breast swelling, tenderness, discharge or bleeding  Breasts are symmetrical    Abdominal: Soft  Bowel sounds are normal  She exhibits no distension and no mass  There is no tenderness  There is no rebound and no guarding  No hernia   Hernia confirmed negative in the right inguinal area and confirmed negative in the left inguinal area  Genitourinary: Vagina normal and uterus normal  Rectal exam shows no external hemorrhoid, no internal hemorrhoid, no fissure, no mass, no tenderness, anal tone normal and guaiac negative stool  No breast swelling, tenderness, discharge or bleeding  No labial fusion  There is no rash, tenderness, lesion or injury on the right labia  There is no rash, tenderness, lesion or injury on the left labia  Cervix exhibits no motion tenderness, no discharge and no friability  Right adnexum displays no mass, no tenderness and no fullness  Left adnexum displays no mass, no tenderness and no fullness  No erythema, tenderness or bleeding in the vagina  No foreign body in the vagina  No signs of injury around the vagina  No vaginal discharge found  Lymphadenopathy: No inguinal adenopathy noted on the right or left side  Neurological: She is alert and oriented to person, place, and time  Skin: Skin is warm and dry  Psychiatric: She has a normal mood and affect   Her behavior is normal  Judgment and thought content normal

## 2019-03-24 LAB
HPV HR 12 DNA CVX QL NAA+PROBE: NOT DETECTED
HPV16 DNA SPEC QL NAA+PROBE: NOT DETECTED
HPV18 DNA SPEC QL NAA+PROBE: NOT DETECTED
THIN PREP CVX: NORMAL

## 2019-06-18 DIAGNOSIS — E78.01 FAMILIAL HYPERCHOLESTEROLEMIA: ICD-10-CM

## 2019-06-19 RX ORDER — ROSUVASTATIN CALCIUM 5 MG/1
5 TABLET, COATED ORAL DAILY
Qty: 90 TABLET | Refills: 1 | Status: SHIPPED | OUTPATIENT
Start: 2019-06-19 | End: 2020-07-17 | Stop reason: SDUPTHER

## 2019-07-15 ENCOUNTER — OFFICE VISIT (OUTPATIENT)
Dept: FAMILY MEDICINE CLINIC | Facility: CLINIC | Age: 59
End: 2019-07-15
Payer: COMMERCIAL

## 2019-07-15 VITALS
WEIGHT: 198 LBS | BODY MASS INDEX: 31.08 KG/M2 | TEMPERATURE: 98.9 F | HEART RATE: 88 BPM | OXYGEN SATURATION: 96 % | RESPIRATION RATE: 16 BRPM | HEIGHT: 67 IN | DIASTOLIC BLOOD PRESSURE: 68 MMHG | SYSTOLIC BLOOD PRESSURE: 114 MMHG

## 2019-07-15 DIAGNOSIS — J06.9 ACUTE URI: Primary | ICD-10-CM

## 2019-07-15 DIAGNOSIS — K21.9 GASTROESOPHAGEAL REFLUX DISEASE, ESOPHAGITIS PRESENCE NOT SPECIFIED: ICD-10-CM

## 2019-07-15 PROCEDURE — 3008F BODY MASS INDEX DOCD: CPT | Performed by: NURSE PRACTITIONER

## 2019-07-15 PROCEDURE — 99213 OFFICE O/P EST LOW 20 MIN: CPT | Performed by: NURSE PRACTITIONER

## 2019-07-15 PROCEDURE — 1036F TOBACCO NON-USER: CPT | Performed by: NURSE PRACTITIONER

## 2019-07-15 RX ORDER — AZITHROMYCIN 250 MG/1
TABLET, FILM COATED ORAL
Qty: 6 TABLET | Refills: 0 | Status: SHIPPED | OUTPATIENT
Start: 2019-07-15 | End: 2019-07-20

## 2019-07-15 RX ORDER — OMEPRAZOLE 40 MG/1
40 CAPSULE, DELAYED RELEASE ORAL DAILY
Qty: 90 CAPSULE | Refills: 1 | Status: SHIPPED | OUTPATIENT
Start: 2019-07-15 | End: 2020-04-15

## 2019-07-15 NOTE — PROGRESS NOTES
Assessment/Plan:     Diagnoses and all orders for this visit:    Acute URI  -     azithromycin (ZITHROMAX) 250 mg tablet; Take 2 tabs on Day 1 than 1 tab Days 2-5    Gastroesophageal reflux disease, esophagitis presence not specified  -     omeprazole (PriLOSEC) 40 MG capsule; Take 1 capsule (40 mg total) by mouth daily    #1 Acute URI  Discussed with patient plan to treat with a course of azithromycin  #2 GERD  Discussed with patient rationale to change from a PPI (omeprazole) to a H-2 Block (Pepcid)  Patient prefers to return to using PPI (omeprazole at this time due to lack of control on H-2 Blocker (Pepcid)  Patient instructed to call if no improvement in 72 hours or symptoms worsen      Subjective:      Patient ID: Layla Weems is a 62 y o  female  62 y  o female presenting with sore throat, ear pain and fever for the past three days  She reports having a fever, chills and body aches over the weekend  She reports that she was camping over the week in her family's RV  She was taking Tylenol to treat the symptoms  She feels slightly better today with the fever gone but still having symptoms        Family History   Problem Relation Age of Onset    Stroke Mother         stroke syndrome    Stroke Father         stroke syndrome    Diabetes type II Father     Breast cancer Sister     Rectal cancer Sister     Colon cancer Brother     Colon cancer Sister      Social History     Socioeconomic History    Marital status: /Civil Union     Spouse name: Not on file    Number of children: Not on file    Years of education: Not on file    Highest education level: Not on file   Occupational History    Not on file   Social Needs    Financial resource strain: Not on file    Food insecurity:     Worry: Not on file     Inability: Not on file    Transportation needs:     Medical: Not on file     Non-medical: Not on file   Tobacco Use    Smoking status: Never Smoker    Smokeless tobacco: Never Used Substance and Sexual Activity    Alcohol use: Yes     Comment: 2 drinks/night    Drug use: No    Sexual activity: Yes   Lifestyle    Physical activity:     Days per week: Not on file     Minutes per session: Not on file    Stress: Not on file   Relationships    Social connections:     Talks on phone: Not on file     Gets together: Not on file     Attends Rastafarian service: Not on file     Active member of club or organization: Not on file     Attends meetings of clubs or organizations: Not on file     Relationship status: Not on file    Intimate partner violence:     Fear of current or ex partner: Not on file     Emotionally abused: Not on file     Physically abused: Not on file     Forced sexual activity: Not on file   Other Topics Concern    Not on file   Social History Narrative    Not on file     Past Medical History:   Diagnosis Date    Chalazion     Family hx of colon cancer requiring screening colonoscopy     sister rectal ca,brother  colon cancer,sister breast cancer    GERD (gastroesophageal reflux disease)     High cholesterol     History of colon polyps     Osteoarthritis     Palpitations      Past Surgical History:   Procedure Laterality Date     SECTION      x2    COLONOSCOPY N/A 2016    Procedure: COLONOSCOPY;  Surgeon: Rafaela Hamilton MD;  [facility]; Service:     COLONOSCOPY W/ POLYPECTOMY      OVARIAN CYST REMOVAL      RI COLONOSCOPY FLX DX W/COLLJ SPEC WHEN PFRMD N/A 2017    Procedure: COLONOSCOPY;  Surgeon: Rafaela Hamilton MD;  [facility]; Service: Colorectal    RI ESOPHAGOGASTRODUODENOSCOPY TRANSORAL DIAGNOSTIC N/A 2017    Procedure: ESOPHAGOGASTRODUODENOSCOPY (EGD); Surgeon: Gregg Duval MD;  [facility];   Service: Gastroenterology    RI EXCIS PRIMARY GANGLION WRIST Right 2018    Procedure: WRIST GANGLION CYST EXCISION;  Surgeon: Mallory Burgess DO;  [facility];  Service: Orthopedics    RI REVISE MEDIAN N/CARPAL TUNNEL SURG Right 11/13/2018    Procedure: CARPAL TUNNEL RELEASE;  Surgeon: Dwayne Scott DO;  [facility];  Service: Orthopedics    RECTAL POLYPECTOMY      last assessed:  5/6/14    TONSILLECTOMY       Allergies   Allergen Reactions    Penicillins Hives       Current Outpatient Medications:     Nutritional Supplements (VITAMIN D MAINTENANCE PO), Take 2,000 Units by mouth daily  , Disp: , Rfl:     rosuvastatin (CRESTOR) 5 mg tablet, Take 1 tablet (5 mg total) by mouth daily, Disp: 90 tablet, Rfl: 1    azithromycin (ZITHROMAX) 250 mg tablet, Take 2 tabs on Day 1 than 1 tab Days 2-5, Disp: 6 tablet, Rfl: 0    omeprazole (PriLOSEC) 40 MG capsule, Take 1 capsule (40 mg total) by mouth daily, Disp: 90 capsule, Rfl: 1      Review of Systems   Constitutional: Positive for chills, fatigue and fever  HENT: Positive for congestion, ear pain, postnasal drip, rhinorrhea and sore throat  Eyes: Negative  Respiratory: Negative  Cardiovascular: Negative  Gastrointestinal: Negative  Musculoskeletal: Negative  Neurological: Negative  Psychiatric/Behavioral: Negative  Objective:    /68   Pulse 88   Temp 98 9 °F (37 2 °C)   Resp 16   Ht 5' 7" (1 702 m)   Wt 89 8 kg (198 lb)   LMP  (LMP Unknown)   SpO2 96%   BMI 31 01 kg/m² (Reviewed)     Physical Exam   Constitutional: She is oriented to person, place, and time  Vital signs are normal  She appears well-developed and well-nourished  HENT:   Head: Normocephalic and atraumatic  Right Ear: External ear and ear canal normal  A middle ear effusion is present  Left Ear: Tympanic membrane, external ear and ear canal normal    Nose: Rhinorrhea present  Mouth/Throat: Uvula is midline and mucous membranes are normal  Oropharyngeal exudate and posterior oropharyngeal erythema present  Eyes: Pupils are equal, round, and reactive to light   Conjunctivae, EOM and lids are normal    Neck: Trachea normal  Neck supple  Cardiovascular: Normal rate, regular rhythm, normal heart sounds and intact distal pulses  Pulmonary/Chest: Effort normal  She has rhonchi in the right upper field and the left upper field  Lymphadenopathy:        Head (right side): Submental, submandibular and tonsillar adenopathy present  Head (left side): Submental, submandibular and tonsillar adenopathy present  She has no cervical adenopathy  Neurological: She is alert and oriented to person, place, and time  Skin: Skin is warm and dry  Capillary refill takes less than 2 seconds  Psychiatric: She has a normal mood and affect  Her behavior is normal        BMI Counseling: Body mass index is 31 01 kg/m²  Discussed the patient's BMI with her  The BMI is above average  BMI counseling and education was provided to the patient  Nutrition recommendations include reducing portion sizes, decreasing overall calorie intake, consuming healthier snacks, moderation in carbohydrate intake and reducing intake of saturated fat and trans fat  Exercise recommendations include exercising 3-5 times per week and strength training exercises

## 2019-09-12 ENCOUNTER — OFFICE VISIT (OUTPATIENT)
Dept: FAMILY MEDICINE CLINIC | Facility: CLINIC | Age: 59
End: 2019-09-12
Payer: COMMERCIAL

## 2019-09-12 VITALS
OXYGEN SATURATION: 98 % | SYSTOLIC BLOOD PRESSURE: 136 MMHG | RESPIRATION RATE: 16 BRPM | DIASTOLIC BLOOD PRESSURE: 84 MMHG | TEMPERATURE: 99.1 F | BODY MASS INDEX: 31.56 KG/M2 | HEART RATE: 76 BPM | WEIGHT: 201.5 LBS

## 2019-09-12 DIAGNOSIS — H81.13 BENIGN PAROXYSMAL POSITIONAL VERTIGO DUE TO BILATERAL VESTIBULAR DISORDER: Primary | ICD-10-CM

## 2019-09-12 PROCEDURE — 99214 OFFICE O/P EST MOD 30 MIN: CPT | Performed by: FAMILY MEDICINE

## 2019-09-12 RX ORDER — MECLIZINE HYDROCHLORIDE 25 MG/1
25 TABLET ORAL 3 TIMES DAILY PRN
Qty: 30 TABLET | Refills: 0 | Status: SHIPPED | OUTPATIENT
Start: 2019-09-12 | End: 2020-03-24 | Stop reason: ALTCHOICE

## 2019-09-12 NOTE — PROGRESS NOTES
FAMILY MEDICINE PROGRESS NOTE  Ubaldo Hodgson 61 y o  female   DATE: September 12, 2019     ASSESSMENT and PLAN:  Ubaldo Hodgson is a 61 y o  female with:     1  Benign paroxysmal positional vertigo due to bilateral vestibular disorder  Positive [place] Hallpike maneuver with reproducible symptoms today  [facility]  Start meclizine PRN, given handout on home maneuvers and if doesn't improve, will refer to vestibular therapy  Less likely cardiac, but will be scheduling her routine follow up with her EP doc    - meclizine (ANTIVERT) 25 mg tablet; Take 1 tablet (25 mg total) by mouth 3 (three) times a day as needed for dizziness  Dispense: 30 tablet; Refill: 0    Patient agreeable with the plan and expressed understanding  I discucssed signs and symptoms for which to RTC, go to ER or seek urgent medical care  SUBJECTIVE:  Ubaldo Hodgson is a 61 y o  female who presents today with a chief complaint of Dizziness and Nausea  Dizziness   This is a new problem  The current episode started 1 to 4 weeks ago  The problem occurs intermittently (every few days)  The problem has been unchanged  Associated symptoms include fatigue, nausea and a visual change (only during one episode)  Pertinent negatives include no change in bowel habit, fever, headaches, numbness, urinary symptoms or vomiting  Associated symptoms comments: No syncope  The symptoms are aggravated by standing  She has tried rest, lying down and heat for the symptoms  Review of Systems   Constitutional: Positive for fatigue  Negative for fever  Gastrointestinal: Positive for nausea  Negative for change in bowel habit and vomiting  Neurological: Positive for dizziness and light-headedness  Negative for tremors, numbness and headaches  I have reviewed the patient's Past Medical History      OBJECTIVE:  /84   Pulse 76   Temp 99 1 °F (37 3 °C)   Resp 16   Wt 91 4 kg (201 lb 8 oz)   LMP  (LMP Unknown)   SpO2 98%   BMI 31 56 kg/m²    Physical Exam   Constitutional: She appears well-developed and well-nourished  No distress  obese   Cardiovascular: Normal rate, regular rhythm and normal heart sounds  Pulmonary/Chest: Effort normal and breath sounds normal  No respiratory distress  She has no wheezes  She has no rales  Neurological:   Positive [place] Hallpike bilaterally, mild   Skin: She is not diaphoretic  Vitals reviewed  Emerald Mcknight MD    Note: Portions of the record have been created with voice recognition software  Occasional wrong word or "sound a like" substitutions may have occurred due to the inherent limitations of voice recognition software  Read the chart carefully and recognize, using context, where substitutions have occurred

## 2019-09-12 NOTE — PATIENT INSTRUCTIONS
Benign Paroxysmal Positional Vertigo   AMBULATORY CARE:   Benign paroxysmal positional vertigo (BPPV)  is an inner ear condition that causes you to suddenly feel dizzy  Benign means it is not serious or life-threatening  BPPV is caused by a problem with the nerves and structure of your inner ear  BPPV happens when small pieces of calcium break loose and lump together in one of your inner ear canals  Common symptoms include the following: You may feel that you or the room is moving or spinning  Turning your head, rolling over in bed, getting up or lying down may lead to sudden vertigo  You may also have any of the following symptoms:  · Nystagmus (quick shaky eye movement that you cannot control)    · Nausea    · Poor balance and feeling unsteady when you walk  Seek care immediately if:   · You fall during a BPPV episode and are injured  · You have a severe headache that does not go away  · You have new changes in your vision or feel weak or confused  · You have problems hearing, or you have ringing or buzzing in your ears  Contact your healthcare provider if:   · Your BPPV symptoms do not go away or they return  · You have problems with your balance, or you are falling often  · You have new or increased nausea or vomiting with vertigo  · You feel anxious or depressed and do not want to leave your home  · You have questions or concerns about your condition or care  Management of BPPV:   · Your healthcare provider will teach you how to move your head and body to prevent symptoms  For example, he or she may teach you certain ways to move your head or body  These movements usually help relieve your symptoms and keep the dizziness from returning  The exercises help move the calcium pieces to a different part of your ear  Do the movements only as directed  · Vestibular and balance rehabilitation therapy (VBRT)  is used to teach you exercises to improve your balance and strength   VBRT may help decrease your dizziness and prevent injuries if you are at risk for falls  · Medicines  may be recommended or prescribed to treat dizziness or nausea  Prevent your symptoms:   · Try to avoid sudden head movements  Stand up and lie down slowly  · Raise and support your head when you lie down  Place pillows under your upper back and head or rest in a recliner  · Change your position often when you are lying down  Try not to lie with your head on the same side for long periods of time  Roll over slowly  · Wear protective gear  when you ride a bike or play sports  A helmet helps protect your head from injury  Follow up with your healthcare provider as directed: You may need to return in 1 month to check the progress of your treatment  Write down your questions so you remember to ask them during your visits  © 2017 [employer] Information is for End User's use only and may not be sold, redistributed or otherwise used for commercial purposes  All illustrations and images included in CareNotes® are the copyrighted property of [employer]  or [employer]  The above information is an  only  It is not intended as medical advice for individual conditions or treatments  Talk to your doctor, nurse or pharmacist before following any medical regimen to see if it is safe and effective for you

## 2020-02-16 ENCOUNTER — OFFICE VISIT (OUTPATIENT)
Dept: URGENT CARE | Facility: MEDICAL CENTER | Age: 60
End: 2020-02-16
Payer: COMMERCIAL

## 2020-02-16 VITALS
HEIGHT: 67 IN | WEIGHT: 203 LBS | BODY MASS INDEX: 31.86 KG/M2 | RESPIRATION RATE: 18 BRPM | HEART RATE: 84 BPM | TEMPERATURE: 99.8 F | OXYGEN SATURATION: 97 % | SYSTOLIC BLOOD PRESSURE: 153 MMHG | DIASTOLIC BLOOD PRESSURE: 89 MMHG

## 2020-02-16 DIAGNOSIS — J06.9 UPPER RESPIRATORY TRACT INFECTION, UNSPECIFIED TYPE: Primary | ICD-10-CM

## 2020-02-16 PROCEDURE — 99213 OFFICE O/P EST LOW 20 MIN: CPT | Performed by: PHYSICIAN ASSISTANT

## 2020-02-16 RX ORDER — BENZONATATE 200 MG/1
200 CAPSULE ORAL 3 TIMES DAILY PRN
Qty: 20 CAPSULE | Refills: 0 | Status: SHIPPED | OUTPATIENT
Start: 2020-02-16 | End: 2020-03-24 | Stop reason: ALTCHOICE

## 2020-02-16 NOTE — PROGRESS NOTES
[facility]        NAME: Sushil Coyne is a 61 y o  female  : 1960    MRN: [id number]  DATE: 2020  TIME: 3:25 PM    Assessment and Plan   Upper respiratory tract infection, unspecified type [J06 9]  1  Upper respiratory tract infection, unspecified type  benzonatate (TESSALON) 200 MG capsule         Patient Instructions     1  Motrin as needed for fever/body aches  2  Increase fluids  3  Take Tessalon 200mg  Every 8 hours as needed for cough/congestion  4  Follow up with PCP in 3-5 days if symptoms persist       Chief Complaint     Chief Complaint   Patient presents with    Facial Pain     Pt  with sinus pressure and congestion for the past 3 days  Temp of 101 yesterday  History of Present Illness       Garrett Bella is a 51-year-old female presents with a 2 day history of nasal congestion, headache, sinus pressure and cough  Patient did report fever and chills at the onset of her symptoms but denies any body aches, nausea or vomiting since the onset of her illness  Review of Systems   Review of Systems   Constitutional: Positive for fatigue and fever  HENT: Positive for congestion, postnasal drip and sinus pressure  Respiratory: Positive for cough  Gastrointestinal: Negative            Current Medications       Current Outpatient Medications:     Nutritional Supplements (VITAMIN D MAINTENANCE PO), Take 2,000 Units by mouth daily  , Disp: , Rfl:     omeprazole (PriLOSEC) 40 MG capsule, Take 1 capsule (40 mg total) by mouth daily, Disp: 90 capsule, Rfl: 1    rosuvastatin (CRESTOR) 5 mg tablet, Take 1 tablet (5 mg total) by mouth daily, Disp: 90 tablet, Rfl: 1    benzonatate (TESSALON) 200 MG capsule, Take 1 capsule (200 mg total) by mouth 3 (three) times a day as needed for cough, Disp: 20 capsule, Rfl: 0    meclizine (ANTIVERT) 25 mg tablet, Take 1 tablet (25 mg total) by mouth 3 (three) times a day as needed for dizziness (Patient not taking: Reported on 2020), Disp: 30 tablet, Rfl: 0    Current Allergies     Allergies as of 2020 - Reviewed 2020   Allergen Reaction Noted    Penicillins Hives 2016            The following portions of the patient's history were reviewed and updated as appropriate: allergies, current medications, past family history, past medical history, past social history, past surgical history and problem list      Past Medical History:   Diagnosis Date    Chalazion     Family hx of colon cancer requiring screening colonoscopy     sister rectal ca,brother  colon cancer,sister breast cancer    GERD (gastroesophageal reflux disease)     High cholesterol     History of colon polyps     Osteoarthritis     Palpitations        Past Surgical History:   Procedure Laterality Date     SECTION      x2    COLONOSCOPY N/A 2016    Procedure: COLONOSCOPY;  Surgeon: Maribell Knapp MD;  [facility]; Service:     COLONOSCOPY W/ POLYPECTOMY      OVARIAN CYST REMOVAL      HI COLONOSCOPY FLX DX W/COLLJ SPEC WHEN PFRMD N/A 2017    Procedure: COLONOSCOPY;  Surgeon: Maribell Knapp MD;  [facility]; Service: Colorectal    HI ESOPHAGOGASTRODUODENOSCOPY TRANSORAL DIAGNOSTIC N/A 2017    Procedure: ESOPHAGOGASTRODUODENOSCOPY (EGD); Surgeon: Baldomero Saenz MD;  [facility];   Service: Gastroenterology    HI EXCIS PRIMARY GANGLION WRIST Right 2018    Procedure: WRIST GANGLION CYST EXCISION;  Surgeon: Philip Nash DO;  [facility];  Service: Orthopedics    HI REVISE MEDIAN N/CARPAL TUNNEL SURG Right 2018    Procedure: CARPAL TUNNEL RELEASE;  Surgeon: Philip Nash DO;  [facility];  Service: Orthopedics    RECTAL POLYPECTOMY      last assessed:  14    TONSILLECTOMY         Family History   Problem Relation Age of Onset    Stroke Mother         stroke syndrome    Stroke Father         stroke syndrome    Diabetes type II Father     Breast cancer Sister     Rectal cancer Sister     Colon cancer Brother     Colon cancer Sister          Medications have been verified  Objective   /89   Pulse 84   Temp 99 8 °F (37 7 °C) (Temporal)   Resp 18   Ht 5' 7" (1 702 m)   Wt 92 1 kg (203 lb)   LMP  (LMP Unknown)   SpO2 97%   BMI 31 79 kg/m²        Physical Exam     Physical Exam   Constitutional: She appears well-developed and well-nourished  No distress  HENT:   Head: Normocephalic and atraumatic  Right Ear: Tympanic membrane and ear canal normal    Left Ear: Tympanic membrane and ear canal normal    Nose: Mucosal edema and rhinorrhea present  Mouth/Throat: Uvula is midline, oropharynx is clear and moist and mucous membranes are normal    Cardiovascular: Normal rate, regular rhythm and normal heart sounds  No murmur heard    Pulmonary/Chest: Effort normal and breath sounds normal

## 2020-02-16 NOTE — PATIENT INSTRUCTIONS
1  Motrin as needed for fever/body aches  2  Increase fluids  3  Take Tessalon 200mg  Every 8 hours as needed for cough/congestion  4   Follow up with PCP in 3-5 days if symptoms persist

## 2020-03-24 ENCOUNTER — TELEPHONE (OUTPATIENT)
Dept: SURGICAL ONCOLOGY | Facility: CLINIC | Age: 60
End: 2020-03-24

## 2020-03-24 ENCOUNTER — ANNUAL EXAM (OUTPATIENT)
Dept: OBGYN CLINIC | Facility: CLINIC | Age: 60
End: 2020-03-24
Payer: COMMERCIAL

## 2020-03-24 VITALS
HEIGHT: 67 IN | SYSTOLIC BLOOD PRESSURE: 142 MMHG | DIASTOLIC BLOOD PRESSURE: 80 MMHG | BODY MASS INDEX: 30.13 KG/M2 | WEIGHT: 192 LBS

## 2020-03-24 DIAGNOSIS — Z80.3 FAMILY HISTORY OF BREAST CANCER: ICD-10-CM

## 2020-03-24 DIAGNOSIS — N60.01 BREAST CYST, RIGHT: Primary | ICD-10-CM

## 2020-03-24 DIAGNOSIS — Z01.419 ENCOUNTER FOR ANNUAL ROUTINE GYNECOLOGICAL EXAMINATION: ICD-10-CM

## 2020-03-24 DIAGNOSIS — Z80.0 FAMILY HISTORY OF COLON CANCER: ICD-10-CM

## 2020-03-24 PROCEDURE — S0612 ANNUAL GYNECOLOGICAL EXAMINA: HCPCS | Performed by: OBSTETRICS & GYNECOLOGY

## 2020-03-24 NOTE — TELEPHONE ENCOUNTER
Genetics New Patient Intake Form   Patient Details:     Mayra Issa    1960    [id number]    Background Information:    "On Hold (Urgent Slot)" is set aside for Pancreatic, Ovarian, and Scheduled Surgery Patients  If it is not scheduled by the previous Friday, any patient can be scheduled in it  Who is calling to schedule? If not self, relationship to patient? self   Referring Provider Dr Paulina Ocampo   Is this a personal or family history?  family   If personal history, what age were you at diagnosis? na   What is the type of tumor? Colon, breast, rectal     Pancreatic and Ovarian needs to be scheduled in the "On Hold (Urgent Slot)"   Do you have a pending surgery for your cancer diagnosis? No    If yes: needs to be scheduled in the "On Hold (Urgent Slot)"   Referring Provider Department OBGYN   Did the patient schedule an appointment? Yes   List Appointment Date and Provider Name  Damien 4/3   Scheduling Information:   Preferred [place]: [place] and [place]   Are there any dates/time the patient cannot be seen?  na   Miscellaneous: na   After completing the above information, please route to Oncology Genetics

## 2020-03-24 NOTE — PROGRESS NOTES
Assessment/Plan:  Normal breast and gyn exam    Family history of colon rectal and breast cancer    Plan:  Recommend genetic testing; consult placed             Request for diagnostic 6 month follow-up mammogram and normal screening mammogram   Patient scheduled for repeat colonoscopy 2020   Continue healthy diet and exercise    Subjective:      Patient ID: Ariela Graham is a 61 y  o  female presents for yearly exam with no complaints  Patient denies any breast problems, pelvic pain, vaginal bleeding, bowel or bladder issues  Patient had a and colonoscopy  which showed polyps and is on a 3 year schedule  Patient's brother  from colon cancer  Patient's sister is a 8 year survival breast cancer but recently developed rectal cancer  Review of Systems   Constitutional: Negative  HENT: Negative  Eyes: Negative  Respiratory: Negative  Cardiovascular: Negative  Gastrointestinal: Negative  Endocrine: Negative  Musculoskeletal: Negative  Skin: Negative  Allergic/Immunologic: Negative  Neurological: Negative  Hematological: Negative  Psychiatric/Behavioral: Negative  All other systems reviewed and are negative  Objective:      /80 (BP Location: Left arm, Patient Position: Sitting, Cuff Size: Standard)   Ht 5' 7" (1 702 m)   Wt 87 1 kg (192 lb)   LMP  (LMP Unknown)   BMI 30 07 kg/m²          Physical Exam   Constitutional: She is oriented to person, place, and time  She appears well-developed and well-nourished  HENT:   Head: Normocephalic and atraumatic  Neck: Normal range of motion  Neck supple  No tracheal deviation present  No thyromegaly present  Cardiovascular: Normal rate, regular rhythm and normal heart sounds  Pulmonary/Chest: Effort normal and breath sounds normal  No stridor  No respiratory distress  She has no wheezes  She has no rales  She exhibits no tenderness   Right breast exhibits no inverted nipple, no mass, no nipple discharge, no skin change and no tenderness  Left breast exhibits no inverted nipple, no mass, no nipple discharge, no skin change and no tenderness  No breast swelling, tenderness, discharge or bleeding  Breasts are symmetrical    Abdominal: Soft  Bowel sounds are normal  She exhibits no distension and no mass  There is no tenderness  There is no rebound and no guarding  No hernia  Hernia confirmed negative in the right inguinal area and confirmed negative in the left inguinal area  Genitourinary: Vagina normal and uterus normal  Rectal exam shows no external hemorrhoid, no internal hemorrhoid, no fissure and no mass  No breast swelling, tenderness, discharge or bleeding  No labial fusion  There is no rash, tenderness, lesion or injury on the right labia  There is no rash, tenderness, lesion or injury on the left labia  Uterus is not deviated, not enlarged, not fixed and not tender  Cervix exhibits no motion tenderness, no discharge and no friability  Right adnexum displays no mass, no tenderness and no fullness  Left adnexum displays no mass, no tenderness and no fullness  No erythema, tenderness or bleeding in the vagina  No foreign body in the vagina  No signs of injury around the vagina  No vaginal discharge found  Genitourinary Comments: Normal urethra normal [place]'s gland  Lymphadenopathy: No inguinal adenopathy noted on the right or left side  Neurological: She is alert and oriented to person, place, and time  Skin: Skin is warm and dry  Psychiatric: She has a normal mood and affect   Her behavior is normal  Judgment and thought content normal

## 2020-03-24 NOTE — PROGRESS NOTES
The patient is here for a yearly  She is not due for a pap smear  She is having urinary leakage when she exercises

## 2020-04-08 NOTE — PATIENT INSTRUCTIONS
Gastroesophageal Reflux Disease   AMBULATORY CARE:   Gastroesophageal reflux  reflux occurs when acid and food in the stomach back up into the esophagus  Gastroesophageal reflux disease (GERD) is reflux that occurs more than twice a week for a few weeks  It usually causes heartburn and other symptoms  GERD can cause other health problems over time if it is not treated  Common symptoms include:  Heartburn is the most common symptom of GERD  You may feel burning pain in your chest or below the breast bone  This usually occurs after meals and spreads to your neck, jaw, or shoulder  The pain gets better when you change positions  You may also have any of the following:  · Bitter or acid taste in your mouth    · Dry cough    · Trouble swallowing or pain with swallowing    · Hoarseness or sore throat    · Frequent burping or hiccups    · Feeling of fullness soon after you start eating  Seek care immediately if:  · You feel full and cannot burp or vomit  · You have severe chest pain and sudden trouble breathing  · Your bowel movements are black, bloody, or tarry-looking  · Your vomit looks like coffee grounds or has blood in it  Contact your healthcare provider if:   · You vomit large amounts, or you vomit often  · You have trouble breathing after you vomit  · You have trouble swallowing, or pain with swallowing  · You are losing weight without trying  · Your symptoms get worse or do not improve with treatment  · You have questions or concerns about your condition or care  Treatment for GERD:  Your healthcare provider may prescribe medicine to decrease stomach acid  He may also prescribe medicine that help your esophagus and stomach move food and liquid to your intestines  Surgery may be done if other treatments do not work  You may need surgery to wrap the upper part of the stomach around the esophageal sphincter  This will strengthen the sphincter and prevent reflux     Manage GERD: · Do not have foods or drinks that may increase heartburn  These include chocolate, peppermint, fried or fatty foods, drinks that contain caffeine, or carbonated drinks (soda)  Other foods include spicy foods, onions, tomatoes, and tomato-based foods  Do not have foods or drinks that can irritate your esophagus, such as citrus fruits, juices, and alcohol  · Do not eat large meals  When you eat a lot of food at one time, your stomach needs more acid to digest it  Eat 6 small meals each day instead of 3 large ones, and eat slowly  Do not eat meals 2 to 3 hours before bedtime  · Elevate the head of your bed  Place 6-inch blocks under the head of your bed frame  You may also use more than one pillow under your head and shoulders while you sleep  · Maintain a healthy weight  If you are overweight, weight loss may help relieve symptoms of GERD  · Do not smoke  Smoking weakens the lower esophageal sphincter and increases the risk of GERD  Ask your healthcare provider for information if you currently smoke and need help to quit  E-cigarettes or smokeless tobacco still contain nicotine  Talk to your healthcare provider before you use these products  · Do not wear clothing that is tight around your waist   Tight clothing can put pressure on your stomach and cause or worsen GERD symptoms  Follow up with your healthcare provider as directed:  Write down your questions so you remember to ask them during your visits  © 2017 [employer] Information is for End User's use only and may not be sold, redistributed or otherwise used for commercial purposes  All illustrations and images included in CareNotes® are the copyrighted property of A D A M , Inc  or Luk[employer]oy[employer]  The above information is an  only  It is not intended as medical advice for individual conditions or treatments   Talk to your doctor, nurse or pharmacist before following any medical regimen to see if it is safe and effective for you  171

## 2020-04-15 DIAGNOSIS — K21.9 GASTROESOPHAGEAL REFLUX DISEASE, ESOPHAGITIS PRESENCE NOT SPECIFIED: ICD-10-CM

## 2020-04-15 RX ORDER — OMEPRAZOLE 40 MG/1
CAPSULE, DELAYED RELEASE ORAL
Qty: 90 CAPSULE | Refills: 0 | Status: SHIPPED | OUTPATIENT
Start: 2020-04-15 | End: 2020-12-18

## 2020-06-16 ENCOUNTER — TELEPHONE (OUTPATIENT)
Dept: GYNECOLOGIC ONCOLOGY | Facility: CLINIC | Age: 60
End: 2020-06-16

## 2020-06-19 ENCOUNTER — CONSULT (OUTPATIENT)
Dept: GYNECOLOGIC ONCOLOGY | Facility: CLINIC | Age: 60
End: 2020-06-19

## 2020-06-19 VITALS
BODY MASS INDEX: 31.08 KG/M2 | SYSTOLIC BLOOD PRESSURE: 134 MMHG | HEIGHT: 67 IN | TEMPERATURE: 97.3 F | HEART RATE: 75 BPM | WEIGHT: 198 LBS | DIASTOLIC BLOOD PRESSURE: 78 MMHG

## 2020-06-19 DIAGNOSIS — Z71.83 ENCOUNTER FOR NONPROCREATIVE GENETIC COUNSELING: Primary | ICD-10-CM

## 2020-06-19 DIAGNOSIS — Z80.3 FAMILY HISTORY OF BREAST CANCER: ICD-10-CM

## 2020-06-19 DIAGNOSIS — Z80.0 FAMILY HISTORY OF COLON CANCER: ICD-10-CM

## 2020-06-19 DIAGNOSIS — Z13.79 GENETIC TESTING: ICD-10-CM

## 2020-06-19 PROCEDURE — RECHECK: Performed by: NURSE PRACTITIONER

## 2020-06-26 ENCOUNTER — TELEPHONE (OUTPATIENT)
Dept: GYNECOLOGIC ONCOLOGY | Facility: CLINIC | Age: 60
End: 2020-06-26

## 2020-07-14 DIAGNOSIS — N60.01 BREAST CYST, RIGHT: ICD-10-CM

## 2020-07-17 DIAGNOSIS — E78.01 FAMILIAL HYPERCHOLESTEROLEMIA: ICD-10-CM

## 2020-07-17 RX ORDER — ROSUVASTATIN CALCIUM 5 MG/1
5 TABLET, COATED ORAL DAILY
Qty: 90 TABLET | Refills: 0 | Status: SHIPPED | OUTPATIENT
Start: 2020-07-17 | End: 2020-11-18

## 2020-10-08 ENCOUNTER — OFFICE VISIT (OUTPATIENT)
Dept: FAMILY MEDICINE CLINIC | Facility: CLINIC | Age: 60
End: 2020-10-08
Payer: COMMERCIAL

## 2020-10-08 VITALS
SYSTOLIC BLOOD PRESSURE: 126 MMHG | OXYGEN SATURATION: 97 % | TEMPERATURE: 97.5 F | DIASTOLIC BLOOD PRESSURE: 76 MMHG | HEART RATE: 84 BPM | BODY MASS INDEX: 31.91 KG/M2 | HEIGHT: 67 IN | WEIGHT: 203.31 LBS

## 2020-10-08 DIAGNOSIS — Z00.00 ROUTINE GENERAL MEDICAL EXAMINATION AT A HEALTH CARE FACILITY: ICD-10-CM

## 2020-10-08 DIAGNOSIS — R00.2 INTERMITTENT PALPITATIONS: ICD-10-CM

## 2020-10-08 DIAGNOSIS — Z23 ENCOUNTER FOR IMMUNIZATION: Primary | ICD-10-CM

## 2020-10-08 PROCEDURE — 90471 IMMUNIZATION ADMIN: CPT

## 2020-10-08 PROCEDURE — 99214 OFFICE O/P EST MOD 30 MIN: CPT | Performed by: PHYSICIAN ASSISTANT

## 2020-10-08 PROCEDURE — 93000 ELECTROCARDIOGRAM COMPLETE: CPT | Performed by: PHYSICIAN ASSISTANT

## 2020-10-08 PROCEDURE — 3725F SCREEN DEPRESSION PERFORMED: CPT | Performed by: PHYSICIAN ASSISTANT

## 2020-10-08 PROCEDURE — 1036F TOBACCO NON-USER: CPT | Performed by: PHYSICIAN ASSISTANT

## 2020-10-08 PROCEDURE — 90715 TDAP VACCINE 7 YRS/> IM: CPT

## 2020-10-13 ENCOUNTER — TELEPHONE (OUTPATIENT)
Dept: FAMILY MEDICINE CLINIC | Facility: CLINIC | Age: 60
End: 2020-10-13

## 2020-10-13 ENCOUNTER — APPOINTMENT (OUTPATIENT)
Dept: LAB | Facility: MEDICAL CENTER | Age: 60
End: 2020-10-13
Payer: COMMERCIAL

## 2020-10-13 DIAGNOSIS — R00.2 INTERMITTENT PALPITATIONS: ICD-10-CM

## 2020-10-13 DIAGNOSIS — Z00.00 ROUTINE GENERAL MEDICAL EXAMINATION AT A HEALTH CARE FACILITY: ICD-10-CM

## 2020-10-13 LAB
ALBUMIN SERPL BCP-MCNC: 3.9 G/DL (ref 3.5–5)
ALP SERPL-CCNC: 89 U/L (ref 46–116)
ALT SERPL W P-5'-P-CCNC: 32 U/L (ref 12–78)
ANION GAP SERPL CALCULATED.3IONS-SCNC: 4 MMOL/L (ref 4–13)
AST SERPL W P-5'-P-CCNC: 12 U/L (ref 5–45)
BASOPHILS # BLD AUTO: 0.03 THOUSANDS/ΜL (ref 0–0.1)
BASOPHILS NFR BLD AUTO: 1 % (ref 0–1)
BILIRUB SERPL-MCNC: 0.52 MG/DL (ref 0.2–1)
BUN SERPL-MCNC: 16 MG/DL (ref 5–25)
CALCIUM SERPL-MCNC: 9 MG/DL (ref 8.3–10.1)
CHLORIDE SERPL-SCNC: 105 MMOL/L (ref 100–108)
CHOLEST SERPL-MCNC: 207 MG/DL (ref 50–200)
CO2 SERPL-SCNC: 29 MMOL/L (ref 21–32)
CREAT SERPL-MCNC: 0.82 MG/DL (ref 0.6–1.3)
EOSINOPHIL # BLD AUTO: 0.11 THOUSAND/ΜL (ref 0–0.61)
EOSINOPHIL NFR BLD AUTO: 2 % (ref 0–6)
ERYTHROCYTE [DISTWIDTH] IN BLOOD BY AUTOMATED COUNT: 12.7 % (ref 11.6–15.1)
GFR SERPL CREATININE-BSD FRML MDRD: 78 ML/MIN/1.73SQ M
GLUCOSE P FAST SERPL-MCNC: 92 MG/DL (ref 65–99)
HCT VFR BLD AUTO: 45.1 % (ref 34.8–46.1)
HDLC SERPL-MCNC: 41 MG/DL
HGB BLD-MCNC: 14.5 G/DL (ref 11.5–15.4)
IMM GRANULOCYTES # BLD AUTO: 0.02 THOUSAND/UL (ref 0–0.2)
IMM GRANULOCYTES NFR BLD AUTO: 0 % (ref 0–2)
LDLC SERPL CALC-MCNC: 122 MG/DL (ref 0–100)
LYMPHOCYTES # BLD AUTO: 2.35 THOUSANDS/ΜL (ref 0.6–4.47)
LYMPHOCYTES NFR BLD AUTO: 37 % (ref 14–44)
MCH RBC QN AUTO: 30.7 PG (ref 26.8–34.3)
MCHC RBC AUTO-ENTMCNC: 32.2 G/DL (ref 31.4–37.4)
MCV RBC AUTO: 95 FL (ref 82–98)
MONOCYTES # BLD AUTO: 0.55 THOUSAND/ΜL (ref 0.17–1.22)
MONOCYTES NFR BLD AUTO: 9 % (ref 4–12)
NEUTROPHILS # BLD AUTO: 3.3 THOUSANDS/ΜL (ref 1.85–7.62)
NEUTS SEG NFR BLD AUTO: 51 % (ref 43–75)
NRBC BLD AUTO-RTO: 0 /100 WBCS
PLATELET # BLD AUTO: 222 THOUSANDS/UL (ref 149–390)
PMV BLD AUTO: 11.3 FL (ref 8.9–12.7)
POTASSIUM SERPL-SCNC: 4.4 MMOL/L (ref 3.5–5.3)
PROT SERPL-MCNC: 8.1 G/DL (ref 6.4–8.2)
RBC # BLD AUTO: 4.73 MILLION/UL (ref 3.81–5.12)
SODIUM SERPL-SCNC: 138 MMOL/L (ref 136–145)
TRIGL SERPL-MCNC: 220 MG/DL
TSH SERPL DL<=0.05 MIU/L-ACNC: 1.85 UIU/ML (ref 0.36–3.74)
WBC # BLD AUTO: 6.36 THOUSAND/UL (ref 4.31–10.16)

## 2020-10-13 PROCEDURE — 36415 COLL VENOUS BLD VENIPUNCTURE: CPT

## 2020-10-13 PROCEDURE — 80053 COMPREHEN METABOLIC PANEL: CPT

## 2020-10-13 PROCEDURE — 80061 LIPID PANEL: CPT

## 2020-10-13 PROCEDURE — 84443 ASSAY THYROID STIM HORMONE: CPT

## 2020-10-13 PROCEDURE — 85025 COMPLETE CBC W/AUTO DIFF WBC: CPT

## 2020-10-15 ENCOUNTER — HOSPITAL ENCOUNTER (OUTPATIENT)
Dept: NON INVASIVE DIAGNOSTICS | Facility: HOSPITAL | Age: 60
Discharge: HOME | End: 2020-10-15
Payer: COMMERCIAL

## 2020-10-15 DIAGNOSIS — R00.2 INTERMITTENT PALPITATIONS: ICD-10-CM

## 2020-10-15 PROCEDURE — 93225 XTRNL ECG REC<48 HRS REC: CPT

## 2020-10-15 PROCEDURE — 93226 XTRNL ECG REC<48 HR SCAN A/R: CPT

## 2020-10-23 PROCEDURE — 93227 XTRNL ECG REC<48 HR R&I: CPT | Performed by: INTERNAL MEDICINE

## 2020-11-17 DIAGNOSIS — E78.01 FAMILIAL HYPERCHOLESTEROLEMIA: ICD-10-CM

## 2020-11-18 RX ORDER — ROSUVASTATIN CALCIUM 5 MG/1
TABLET, COATED ORAL
Qty: 90 TABLET | Refills: 3 | Status: SHIPPED | OUTPATIENT
Start: 2020-11-18 | End: 2022-07-14

## 2020-12-18 DIAGNOSIS — K21.9 GASTROESOPHAGEAL REFLUX DISEASE: ICD-10-CM

## 2020-12-18 RX ORDER — OMEPRAZOLE 40 MG/1
CAPSULE, DELAYED RELEASE ORAL
Qty: 90 CAPSULE | Refills: 1 | Status: SHIPPED | OUTPATIENT
Start: 2020-12-18 | End: 2021-08-25

## 2021-02-03 DIAGNOSIS — Z12.39 SCREENING FOR BREAST CANCER: ICD-10-CM

## 2021-08-25 DIAGNOSIS — K21.9 GASTROESOPHAGEAL REFLUX DISEASE: ICD-10-CM

## 2021-08-25 RX ORDER — OMEPRAZOLE 40 MG/1
CAPSULE, DELAYED RELEASE ORAL
Qty: 90 CAPSULE | Refills: 1 | Status: SHIPPED | OUTPATIENT
Start: 2021-08-25 | End: 2022-06-14 | Stop reason: SDUPTHER

## 2022-01-17 ENCOUNTER — TELEPHONE (OUTPATIENT)
Dept: FAMILY MEDICINE CLINIC | Facility: CLINIC | Age: 62
End: 2022-01-17

## 2022-01-17 ENCOUNTER — TELEMEDICINE (OUTPATIENT)
Dept: FAMILY MEDICINE CLINIC | Facility: CLINIC | Age: 62
End: 2022-01-17
Payer: COMMERCIAL

## 2022-01-17 DIAGNOSIS — B34.9 VIRAL INFECTION, UNSPECIFIED: ICD-10-CM

## 2022-01-17 DIAGNOSIS — Z20.822 EXPOSURE TO COVID-19 VIRUS: ICD-10-CM

## 2022-01-17 DIAGNOSIS — Z12.11 COLON CANCER SCREENING: Primary | ICD-10-CM

## 2022-01-17 PROCEDURE — 3725F SCREEN DEPRESSION PERFORMED: CPT | Performed by: PHYSICIAN ASSISTANT

## 2022-01-17 PROCEDURE — 99213 OFFICE O/P EST LOW 20 MIN: CPT | Performed by: PHYSICIAN ASSISTANT

## 2022-01-17 NOTE — PROGRESS NOTES
COVID-19 Outpatient Progress Note    Assessment/Plan:    Problem List Items Addressed This Visit     None      Visit Diagnoses     Colon cancer screening    -  Primary    Relevant Orders    Ambulatory Referral to Gastroenterology    Exposure to COVID-19 virus        Relevant Orders    COVID Only - Office Collect    Viral infection, unspecified        Relevant Orders    COVID Only - Office Collect         Disposition:     Recommended patient to come to the office to test for COVID-19  I recommended self-quarantine for 10 days and to watch for symptoms until 14 days after exposure  If patient were to develop symptoms, they should self isolate and call our office for further guidance  I have spent 13 minutes directly with the patient  Greater than 50% of this time was spent in counseling/coordination of care regarding: prognosis, patient and family education and importance of treatment compliance  Mild symptoms, Full vaccinated  Low concern at this time  Encounter provider Mirtha Mojica PA-C    Provider located at [place] [address]  [phone]    Recent Visits  No visits were found meeting these conditions  Showing recent visits within past 7 days and meeting all other requirements  Today's Visits  Date Type Provider Dept   01/17/22 Telemedicine Mirtha Mojica PA-C [facility]   Showing today's visits and meeting all other requirements  Future Appointments  No visits were found meeting these conditions  Showing future appointments within next 150 days and meeting all other requirements     This virtual check-in was done via [employer] and patient was informed that this is a secure, HIPAA-compliant platform  She agrees to proceed  Patient agrees to participate in a virtual check in via telephone or video visit instead of presenting to the office to address urgent/immediate medical needs   Patient is aware this is a billable service  After connecting through [facility], the patient was identified by name and date of birth  Brunilda Rose was informed that this was a telemedicine visit and that the exam was being conducted confidentially over secure lines  My office door was closed  No one else was in the room  Brunilda Rose acknowledged consent and understanding of privacy and security of the telemedicine visit  I informed the patient that I have reviewed her record in Epic and presented the opportunity for her to ask any questions regarding the visit today  The patient agreed to participate  Verification of patient location:  Patient is located in the following state in which I hold an active license: [place]    Subjective:   Brunilda Rose is a 64 y o  female who is concerned about COVID-19  Patient's symptoms include fever, nasal congestion, rhinorrhea, cough (fits) and headache  Patient denies fatigue, sore throat, anosmia, loss of taste, shortness of breath, chest tightness, nausea, vomiting and diarrhea  - Date of symptom onset: 1/14/2022      COVID-19 vaccination status: Fully vaccinated with booster    Exposure:   Contact with a person who is under investigation (PUI) for or who is positive for COVID-19 within the last 14 days?: Yes    Hospitalized recently for fever and/or lower respiratory symptoms?: No      Currently a healthcare worker that is involved in direct patient care?: Yes      Works in a special setting where the risk of COVID-19 transmission may be high? (this may include long-term care, correctional and longterm facilities; homeless shelters; assisted-living facilities and group homes ): No      Resident in a special setting where the risk of COVID-19 transmission may be high? (this may include long-term care, correctional and longterm facilities; homeless shelters; assisted-living facilities and group homes ): No      Prior COVID infection in [facility]  No sick       No results found for: Elif Suzy, [address] [facility], [facility] [address], [address] [facility]  Past Medical History:   Diagnosis Date    Chalazion     Family hx of colon cancer requiring screening colonoscopy     sister rectal ca,brother  colon cancer,sister breast cancer    GERD (gastroesophageal reflux disease)     High cholesterol     History of colon polyps     Osteoarthritis     Palpitations     Papanicolaou smear 2017    NEG     Past Surgical History:   Procedure Laterality Date     SECTION      x2    COLONOSCOPY N/A 2016    Procedure: COLONOSCOPY;  Surgeon: Josafat Feliz MD;  [facility]; Service:     COLONOSCOPY W/ POLYPECTOMY      MAMMO (HISTORICAL) Bilateral 2019    No evidence of malignancy    OVARIAN CYST REMOVAL      [place] COLONOSCOPY FLX DX W/COLLJ SPEC WHEN PFRMD N/A 2017    Procedure: COLONOSCOPY;  Surgeon: Josafat Feliz MD;  [facility]; Service: Colorectal    [place] ESOPHAGOGASTRODUODENOSCOPY TRANSORAL DIAGNOSTIC N/A 2017    Procedure: ESOPHAGOGASTRODUODENOSCOPY (EGD); Surgeon: Ambreen Maldonado MD;  [facility]; Service: Gastroenterology    [place] EXCIS PRIMARY GANGLION WRIST Right 2018    Procedure: WRIST GANGLION CYST EXCISION;  Surgeon: Elly Ryder DO;  [facility];  Service: Orthopedics    [place] REVISE MEDIAN N/CARPAL TUNNEL SURG Right 2018    Procedure: CARPAL TUNNEL RELEASE;  Surgeon: Elly Ryder DO;  [facility];  Service: Orthopedics    RECTAL POLYPECTOMY      last assessed:  14    TONSILLECTOMY       Current Outpatient Medications   Medication Sig Dispense Refill    Nutritional Supplements (VITAMIN D MAINTENANCE PO) Take 2,000 Units by mouth daily        omeprazole (PriLOSEC) 40 MG capsule TAKE 1 CAPSULE BY MOUTH EVERY DAY 90 capsule 1    rosuvastatin (CRESTOR) 5 mg tablet TAKE 1 TABLET BY MOUTH EVERY DAY 90 tablet 3     No current facility-administered medications for this visit       Allergies Allergen Reactions    Penicillins Hives       Review of Systems   Constitutional: Positive for fever  Negative for fatigue  HENT: Positive for congestion and rhinorrhea  Negative for sore throat  Respiratory: Positive for cough (fits)  Negative for chest tightness and shortness of breath  Gastrointestinal: Negative for diarrhea, nausea and vomiting  Neurological: Positive for headaches  Objective: There were no vitals filed for this visit  Physical Exam  Constitutional:       General: She is not in acute distress  Appearance: She is well-developed  She is not diaphoretic  HENT:      Head: Normocephalic  Pulmonary:      Effort: Pulmonary effort is normal    Neurological:      Mental Status: She is alert  Psychiatric:         Behavior: Behavior normal          Thought Content: Thought content normal          Judgment: Judgment normal          VIRTUAL VISIT DISCLAIMER    Renay Maradiaga verbally agrees to participate in [employer]  Pt is aware that [employer] could be limited without vital signs or the ability to perform a full hands-on physical exam  Bianca Sena understands she or the provider may request at any time to terminate the video visit and request the patient to seek care or treatment in person

## 2022-01-17 NOTE — TELEPHONE ENCOUNTER
FYI: Called patient, inquiring if she was coming to office to be swabbed for Covid  Patient states that she decided that she did want to be tested for Covid

## 2022-06-08 LAB — HBA1C MFR BLD HPLC: 5.7 %

## 2022-06-14 DIAGNOSIS — K21.9 GASTROESOPHAGEAL REFLUX DISEASE: ICD-10-CM

## 2022-06-14 RX ORDER — OMEPRAZOLE 40 MG/1
40 CAPSULE, DELAYED RELEASE ORAL DAILY
Qty: 30 CAPSULE | Refills: 0 | Status: SHIPPED | OUTPATIENT
Start: 2022-06-14

## 2022-06-17 ENCOUNTER — ANESTHESIA (OUTPATIENT)
Dept: GASTROENTEROLOGY | Facility: HOSPITAL | Age: 62
End: 2022-06-17

## 2022-06-17 ENCOUNTER — ANESTHESIA EVENT (OUTPATIENT)
Dept: GASTROENTEROLOGY | Facility: HOSPITAL | Age: 62
End: 2022-06-17

## 2022-06-17 ENCOUNTER — HOSPITAL ENCOUNTER (OUTPATIENT)
Dept: GASTROENTEROLOGY | Facility: HOSPITAL | Age: 62
Setting detail: OUTPATIENT SURGERY
Discharge: HOME | End: 2022-06-17
Attending: COLON & RECTAL SURGERY | Admitting: COLON & RECTAL SURGERY
Payer: COMMERCIAL

## 2022-06-17 VITALS
OXYGEN SATURATION: 97 % | RESPIRATION RATE: 16 BRPM | TEMPERATURE: 97.3 F | HEIGHT: 67 IN | WEIGHT: 200 LBS | SYSTOLIC BLOOD PRESSURE: 105 MMHG | DIASTOLIC BLOOD PRESSURE: 70 MMHG | BODY MASS INDEX: 31.39 KG/M2 | HEART RATE: 67 BPM

## 2022-06-17 DIAGNOSIS — Z86.010 PERSONAL HISTORY OF COLONIC POLYPS: ICD-10-CM

## 2022-06-17 DIAGNOSIS — Z80.0 FAMILY HISTORY OF COLON CANCER: ICD-10-CM

## 2022-06-17 DIAGNOSIS — Z80.0 FAMILY HISTORY OF RECTAL CANCER: ICD-10-CM

## 2022-06-17 PROCEDURE — 88305 TISSUE EXAM BY PATHOLOGIST: CPT | Performed by: SPECIALIST

## 2022-06-17 PROCEDURE — 99213 OFFICE O/P EST LOW 20 MIN: CPT | Performed by: COLON & RECTAL SURGERY

## 2022-06-17 PROCEDURE — 45385 COLONOSCOPY W/LESION REMOVAL: CPT | Performed by: COLON & RECTAL SURGERY

## 2022-06-17 RX ORDER — LIDOCAINE HYDROCHLORIDE 10 MG/ML
INJECTION, SOLUTION EPIDURAL; INFILTRATION; INTRACAUDAL; PERINEURAL AS NEEDED
Status: DISCONTINUED | OUTPATIENT
Start: 2022-06-17 | End: 2022-06-17

## 2022-06-17 RX ORDER — SODIUM CHLORIDE 9 MG/ML
INJECTION, SOLUTION INTRAVENOUS CONTINUOUS PRN
Status: DISCONTINUED | OUTPATIENT
Start: 2022-06-17 | End: 2022-06-17

## 2022-06-17 RX ORDER — PROPOFOL 10 MG/ML
INJECTION, EMULSION INTRAVENOUS AS NEEDED
Status: DISCONTINUED | OUTPATIENT
Start: 2022-06-17 | End: 2022-06-17

## 2022-06-17 RX ADMIN — PROPOFOL 30 MG: 10 INJECTION, EMULSION INTRAVENOUS at 09:22

## 2022-06-17 RX ADMIN — PROPOFOL 150 MG: 10 INJECTION, EMULSION INTRAVENOUS at 09:06

## 2022-06-17 RX ADMIN — PROPOFOL 30 MG: 10 INJECTION, EMULSION INTRAVENOUS at 09:17

## 2022-06-17 RX ADMIN — LIDOCAINE HYDROCHLORIDE 50 MG: 10 INJECTION, SOLUTION EPIDURAL; INFILTRATION; INTRACAUDAL; PERINEURAL at 09:06

## 2022-06-17 RX ADMIN — PROPOFOL 50 MG: 10 INJECTION, EMULSION INTRAVENOUS at 09:08

## 2022-06-17 RX ADMIN — SODIUM CHLORIDE: 9 INJECTION, SOLUTION INTRAVENOUS at 08:55

## 2022-06-17 RX ADMIN — PROPOFOL 20 MG: 10 INJECTION, EMULSION INTRAVENOUS at 09:19

## 2022-06-17 RX ADMIN — PROPOFOL 50 MG: 10 INJECTION, EMULSION INTRAVENOUS at 09:11

## 2022-06-17 RX ADMIN — PROPOFOL 20 MG: 10 INJECTION, EMULSION INTRAVENOUS at 09:14

## 2022-06-17 NOTE — ANESTHESIA POSTPROCEDURE EVALUATION
Post-Op Assessment Note    CV Status:  Stable  Pain Score: 0    Pain management: adequate     Mental Status:  Awake and alert   Hydration Status:  Stable   PONV Controlled:  None   Airway Patency:  Patent and adequate      Post Op Vitals Reviewed: Yes      Staff: CRNA, Anesthesiologist         No complications documented      BP   119/711   Temp      Pulse  78   Resp   16   SpO2   99%

## 2022-06-17 NOTE — H&P
History and Physical   Colon and Rectal Surgery   Sushil Coyne 64 y o  female MRN: [id number]  Unit/Bed#:  Encounter: [id number]  22   8:22 AM      No chief complaint on file  ASSESSMENT:  Sushil Coyne is a 64 y o  female who presents for surveillance colonoscopy, screening  She has history of polyps, TEM for  tubulovillous, adenomatous polyp, last colonoscopy , due for recall  [facility] PLAN:  Screening colonoscopy,discussed in a face-to-face, personal, informed consent process, the benefits, alternatives, risks including not limited to bleeding, missed lesion, perforation requiring emergent surgery discussed/understood  Colonoscopy    History of Present Illness   HPI:  Sushil Coyne is a 64 y o  female who presents for screening colonoscopy  Denies nausea/vomiting/abdominal pain, change in bowel habits, rectal bleeding, or other constitutional symptoms  Historical Information   Past Medical History:   Diagnosis Date    Chalazion     Family hx of colon cancer requiring screening colonoscopy     sister rectal ca,brother  colon cancer,sister breast cancer    GERD (gastroesophageal reflux disease)     High cholesterol     History of colon polyps     Osteoarthritis     Palpitations     Papanicolaou smear 2017    NEG     Past Surgical History:   Procedure Laterality Date     SECTION      x2    COLONOSCOPY N/A 2016    Procedure: COLONOSCOPY;  Surgeon: Ernestine Davis MD;  [facility]; Service:     COLONOSCOPY W/ POLYPECTOMY      MAMMO (HISTORICAL) Bilateral 2019    No evidence of malignancy    OVARIAN CYST REMOVAL      MT COLONOSCOPY FLX DX W/COLLJ SPEC WHEN PFRMD N/A 2017    Procedure: COLONOSCOPY;  Surgeon: Ernestine Davis MD;  [facility]; Service: Colorectal    MT ESOPHAGOGASTRODUODENOSCOPY TRANSORAL DIAGNOSTIC N/A 2017    Procedure: ESOPHAGOGASTRODUODENOSCOPY (EGD);   Surgeon: Trae Alejandro MD;  [facility];   Service: Gastroenterology    AZ EXCIS PRIMARY GANGLION WRIST Right 11/13/2018    Procedure: WRIST GANGLION CYST EXCISION;  Surgeon: Sandhya Wei DO;  [facility];  Service: Orthopedics    AZ REVISE MEDIAN N/CARPAL TUNNEL SURG Right 11/13/2018    Procedure: CARPAL TUNNEL RELEASE;  Surgeon: Sandhya Wei DO;  [facility];  Service: Orthopedics    RECTAL POLYPECTOMY      last assessed:  5/6/14    TONSILLECTOMY         Meds/Allergies     (Not in a hospital admission)        Current Outpatient Medications:     omeprazole (PriLOSEC) 40 MG capsule, Take 1 capsule (40 mg total) by mouth daily, Disp: 30 capsule, Rfl: 0    Nutritional Supplements (VITAMIN D MAINTENANCE PO), Take 2,000 Units by mouth daily  , Disp: , Rfl:     rosuvastatin (CRESTOR) 5 mg tablet, TAKE 1 TABLET BY MOUTH EVERY DAY, Disp: 90 tablet, Rfl: 3    Allergies   Allergen Reactions    Penicillins Hives         Social History   Social History     Substance and Sexual Activity   Alcohol Use Yes    Comment: 2 drinks/night     Social History     Substance and Sexual Activity   Drug Use No     Social History     Tobacco Use   Smoking Status Never Smoker   Smokeless Tobacco Never Used         Family History:   Family History   Problem Relation Age of Onset    Stroke Mother         stroke syndrome    Stroke Father         stroke syndrome    Diabetes type II Father     Hypertension Father     Diabetes Father     Breast cancer Sister 36    Rectal cancer Sister     Colon cancer Brother     Colon cancer Sister     Prostate cancer Paternal Grandfather          Objective     Current Vitals:   Blood Pressure: 134/74 (06/17/22 0808)  Pulse: 79 (06/17/22 0808)  Temperature: 97 9 °F (36 6 °C) (06/17/22 0808)  Temp Source: Tympanic (06/17/22 0808)  Respirations: 16 (06/17/22 0808)  Height: 5' 7" (170 2 cm) (06/17/22 8429)  Weight - Scale: 90 7 kg (200 lb) (06/17/22 0808)  SpO2: 97 % (06/17/22 0808)  No intake or output data in the 24 hours ending 06/17/22 1340    Physical Exam:  General:no distress  Eyes:perrla/eomi  ENT:moist mucus membranes  Neck:supple  Pulm:no increased work of breathing  CV:sinus  Abdomen:soft,nontender

## 2022-06-17 NOTE — ANESTHESIA PREPROCEDURE EVALUATION
Procedure:  COLONOSCOPY    Relevant Problems   CARDIO   (+) Mixed hyperlipidemia   (+) Nonrheumatic aortic valve stenosis   (+) PVC (premature ventricular contraction)      GI/HEPATIC   (+) GERD without esophagitis      Adequately NPO  No prior anesthesia complications  Very mild aortic stenosis on 2015 TTE  Drinks 1-2 alcoholic beverages daily  Physical Exam    Airway    Mallampati score: III  TM Distance: >3 FB  Neck ROM: full     Dental   No notable dental hx     Cardiovascular  Rhythm: regular, Rate: normal,     Pulmonary  Pulmonary exam normal     Other Findings        Anesthesia Plan  ASA Score- 2     Anesthesia Type- IV sedation with anesthesia with ASA Monitors  Additional Monitors:   Airway Plan:     Comment: Spontaneous with supplemental O2  Plan Factors-Exercise tolerance (METS): >4 METS  Chart reviewed  EKG reviewed  Existing labs reviewed  Patient summary reviewed  Patient is not a current smoker  Obstructive sleep apnea risk education given perioperatively  Induction- intravenous  Postoperative Plan-     Informed Consent- Anesthetic plan and risks discussed with patient  I personally reviewed this patient with the CRNA  Discussed and agreed on the Anesthesia Plan with the CRNA  Thor Sullivan

## 2022-07-01 ENCOUNTER — RA CDI HCC (OUTPATIENT)
Dept: OTHER | Facility: HOSPITAL | Age: 62
End: 2022-07-01

## 2022-07-01 NOTE — PROGRESS NOTES
[facility]       Chart reviewed, no opportunity found: CHART REVIEWED, NO OPPORTUNITY FOUND        Patients Insurance        Commercial Insurance: [address] [place] [address]

## 2022-07-07 ENCOUNTER — NURSE TRIAGE (OUTPATIENT)
Dept: FAMILY MEDICINE CLINIC | Facility: CLINIC | Age: 62
End: 2022-07-07

## 2022-07-14 ENCOUNTER — OFFICE VISIT (OUTPATIENT)
Dept: FAMILY MEDICINE CLINIC | Facility: CLINIC | Age: 62
End: 2022-07-14
Payer: COMMERCIAL

## 2022-07-14 VITALS
RESPIRATION RATE: 17 BRPM | HEIGHT: 67 IN | TEMPERATURE: 97.3 F | SYSTOLIC BLOOD PRESSURE: 132 MMHG | OXYGEN SATURATION: 97 % | HEART RATE: 88 BPM | DIASTOLIC BLOOD PRESSURE: 84 MMHG | BODY MASS INDEX: 31.47 KG/M2 | WEIGHT: 200.5 LBS

## 2022-07-14 DIAGNOSIS — E78.01 FAMILIAL HYPERCHOLESTEROLEMIA: ICD-10-CM

## 2022-07-14 DIAGNOSIS — E78.2 MIXED HYPERLIPIDEMIA: ICD-10-CM

## 2022-07-14 DIAGNOSIS — Z00.00 ANNUAL PHYSICAL EXAM: Primary | ICD-10-CM

## 2022-07-14 DIAGNOSIS — Z80.0 FAMILY HISTORY OF COLON CANCER: ICD-10-CM

## 2022-07-14 DIAGNOSIS — K21.9 GERD WITHOUT ESOPHAGITIS: ICD-10-CM

## 2022-07-14 DIAGNOSIS — E66.09 CLASS 1 OBESITY DUE TO EXCESS CALORIES WITH SERIOUS COMORBIDITY AND BODY MASS INDEX (BMI) OF 31.0 TO 31.9 IN ADULT: ICD-10-CM

## 2022-07-14 DIAGNOSIS — H69.82 DYSFUNCTION OF LEFT EUSTACHIAN TUBE: ICD-10-CM

## 2022-07-14 PROCEDURE — 99396 PREV VISIT EST AGE 40-64: CPT | Performed by: FAMILY MEDICINE

## 2022-07-14 RX ORDER — ROSUVASTATIN CALCIUM 5 MG/1
5 TABLET, COATED ORAL DAILY
Qty: 90 TABLET | Refills: 3 | Status: SHIPPED | OUTPATIENT
Start: 2022-07-14

## 2022-07-14 NOTE — PROGRESS NOTES
[facility]AL [facility]ICAL      Date of Service: 22  Primary Care Provider:   Nash Caicedo MD       Name: Ailyn Zuniga       : 1960       Age:61 y o  Sex: female      MRN: [id number]      Chief Complaint:Physical Exam     Assessment and Plan:  64 y o  female exam      1  Health Maintenance  - Colon Cancer Screening: colonoscopy in 2022, recommended 5 year follow-up, strong family history of colon cancer with brother who  at age 61, sister with rectal cancer  - Cervical Cancer Screening: normal PAP, negative HPV in 2019, repeat in   - Breast Cancer Screening: normal mammo in 2022  - Labs: done with employee wellness  - Immunizations: Reviewed  Recommend yearly flu vaccine  2  Other diagnoses addressed today:   Problem List Items Addressed This Visit        Digestive    GERD without esophagitis     Recommended tapering off of omeprazole, taking every other day while concurrently starting Pepcid  Reviewed long term adverse effects of being on PPI  She had EGD many years ago that was reportedly normal               Other    Mixed hyperlipidemia     Lab Results   Component Value Date    CHOL 171 2015    HDL 41 10/13/2020    LDLCALC 122 (H) 10/13/2020    TRIG 220 (H) 10/13/2020     Most recent labs from employee wellness program:  Cholesterol 260      HDL 43    She has been off of rosuvastatin for some time with worsening cholesterol  Will resume              Relevant Medications    rosuvastatin (CRESTOR) 5 mg tablet    Class 1 obesity due to excess calories with serious comorbidity and body mass index (BMI) of 31 0 to 31 9 in adult    Family history of colon cancer      Other Visit Diagnoses     Annual physical exam    -  Primary    Familial hypercholesterolemia        Relevant Medications    rosuvastatin (CRESTOR) 5 mg tablet    Dysfunction of left eustachian tube               Immunizations and preventive care screenings were discussed with patient today  Appropriate education was printed on patient's after visit summary  Counseling:  Alcohol/drug use: discussed moderation in alcohol intake, the recommendations for healthy alcohol use, and avoidance of illicit drug use  Dental Health: discussed importance of regular tooth brushing, flossing, and dental visits  Injury prevention: discussed safety/seat belts, safety helmets, smoke detectors, carbon dioxide detectors, and smoking near bedding or upholstery  Exercise: the importance of regular exercise/physical activity was discussed  Recommend exercise 3-5 times per week for at least 30 minutes  BMI Counseling: Body mass index is 31 4 kg/m²  The BMI is above normal  Nutrition recommendations include encouraging healthy choices of fruits and vegetables, consuming healthier snacks, moderation in carbohydrate intake and reducing intake of saturated and trans fat  Exercise recommendations include moderate physical activity 150 minutes/week and strength training exercises  No pharmacotherapy was ordered  Patient referred to PCP  Rationale for BMI follow-up plan is due to patient being overweight or obese  Follow up next physical in 1 year  Subjective:    Jaky Iraheta is a 64 y o  female and is here for a comprehensive physical exam      Acute Complaints: None  She works as  at [employer], she is very active at work  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  Her other concern is bladder leakage, she would like to see a specialist     Diet and Physical Activity  Diet/Nutrition: does follow a well balanced diet  Exercise: no formal exercise  General Health  Vision: no vision problems and goes for regular eye exams  Dental: regular dental visits  Gyn Health:    Up to date on PAP     No LMP recorded (lmp unknown)   Patient is postmenopausal       Histories Updated and Reviewed 7/14/2022:  Patient's Medications   New Prescriptions    No medications on file   Previous Medications    NUTRITIONAL SUPPLEMENTS (VITAMIN D MAINTENANCE PO)    Take 2,000 Units by mouth daily      OMEPRAZOLE (PRILOSEC) 40 MG CAPSULE    Take 1 capsule (40 mg total) by mouth daily   Modified Medications    Modified Medication Previous Medication    ROSUVASTATIN (CRESTOR) 5 MG TABLET rosuvastatin (CRESTOR) 5 mg tablet       Take 1 tablet (5 mg total) by mouth daily    TAKE 1 TABLET BY MOUTH EVERY DAY   Discontinued Medications    No medications on file     Allergies   Allergen Reactions    Penicillins Hives     Past Medical History:   Diagnosis Date    Chalazion     Family hx of colon cancer requiring screening colonoscopy     sister rectal ca,brother  colon cancer,sister breast cancer    GERD (gastroesophageal reflux disease)     High cholesterol     History of colon polyps     Osteoarthritis     Palpitations     Papanicolaou smear 2017    NEG     Social History     Socioeconomic History    Marital status: /Civil Union     Spouse name: Not on file    Number of children: 2    Years of education: Not on file    Highest education level: Not on file   Occupational History    Not on file   Tobacco Use    Smoking status: Never Smoker    Smokeless tobacco: Never Used   Substance and Sexual Activity    Alcohol use: Yes     Comment: 2 drinks/night    Drug use: No    Sexual activity: Yes     Partners: Male     Birth control/protection: Male Sterilization, Post-menopausal   Other Topics Concern    Not on file   Social History Narrative    Not on file     Social Determinants of Health     Financial Resource Strain: Not on file   Food Insecurity: Not on file   Transportation Needs: Not on file   Physical Activity: Not on file   Stress: Not on file   Social Connections: Not on file   Intimate Partner Violence: Not on file   Housing Stability: Not on file     Immunization History   Administered Date(s) Administered    Tdap 10/08/2020       Objective:  /84 Pulse 88   Temp (!) 97 3 °F (36 3 °C)   Resp 17   Ht 5' 7" (1 702 m)   Wt 90 9 kg (200 lb 8 oz)   LMP  (LMP Unknown)   SpO2 97%   BMI 31 40 kg/m²   BP Readings from Last 3 Encounters:   07/14/22 132/84   06/17/22 105/70   10/08/20 126/76      Wt Readings from Last 3 Encounters:   07/14/22 90 9 kg (200 lb 8 oz)   06/17/22 90 7 kg (200 lb)   10/08/20 92 2 kg (203 lb 5 oz)      Physical Exam  Constitutional:       General: She is not in acute distress  Appearance: Normal appearance  She is obese  She is not ill-appearing or toxic-appearing  HENT:      Head: Normocephalic and atraumatic  Right Ear: External ear normal  No middle ear effusion  Left Ear: External ear normal  A middle ear effusion is present  Nose: Nose normal       Mouth/Throat:      Mouth: Mucous membranes are moist    Eyes:      Extraocular Movements: Extraocular movements intact  Conjunctiva/sclera: Conjunctivae normal    Cardiovascular:      Rate and Rhythm: Normal rate and regular rhythm  Pulses: Normal pulses  Heart sounds: Normal heart sounds  No murmur heard  No friction rub  No gallop  Pulmonary:      Effort: Pulmonary effort is normal  No respiratory distress  Breath sounds: Normal breath sounds  No stridor  No wheezing, rhonchi or rales  Abdominal:      General: There is no distension  Palpations: Abdomen is soft  Tenderness: There is no abdominal tenderness  Musculoskeletal:         General: Normal range of motion  Cervical back: Normal range of motion and neck supple  Right lower leg: No edema  Left lower leg: No edema  Skin:     General: Skin is warm and dry  Findings: No erythema or rash  Neurological:      General: No focal deficit present  Mental Status: She is alert and oriented to person, place, and time     Psychiatric:         Mood and Affect: Mood normal          Behavior: Behavior normal          Patient Care Team:  Raheem Grider MD as PCP - General (Family Medicine)  Juan Carlos Ann MD as PCP - PCP-[facility] Attributed-MD Jocelyn Mcneil DO Jodean Chesterfield, MD Jodean Chesterfield, MD as Trevor Delong MD    Note: Portions of the record may have been created with voice recognition software  Occasional wrong word or "sound a like" substitutions may have occurred due to the inherent limitations of voice recognition software  Read the chart carefully and recognize, using context, where substitutions have occurred

## 2022-07-14 NOTE — ASSESSMENT & PLAN NOTE
Lab Results   Component Value Date    CHOL 171 11/04/2015    HDL 41 10/13/2020    LDLCALC 122 (H) 10/13/2020    TRIG 220 (H) 10/13/2020     Most recent labs from employee wellness program:  Cholesterol 260      HDL 43    She has been off of rosuvastatin for some time with worsening cholesterol  Will resume

## 2022-07-14 NOTE — ASSESSMENT & PLAN NOTE
Recommended tapering off of omeprazole, taking every other day while concurrently starting Pepcid     Reviewed long term adverse effects of being on PPI  She had EGD many years ago that was reportedly normal

## 2022-09-19 DIAGNOSIS — K21.9 GASTROESOPHAGEAL REFLUX DISEASE: ICD-10-CM

## 2022-09-19 RX ORDER — OMEPRAZOLE 40 MG/1
40 CAPSULE, DELAYED RELEASE ORAL DAILY
Qty: 30 CAPSULE | Refills: 5 | Status: SHIPPED | OUTPATIENT
Start: 2022-09-19

## 2022-09-19 NOTE — TELEPHONE ENCOUNTER
Patient is out of medication   Though it was going to be ordered at her physical exam     [facility]

## 2022-10-14 ENCOUNTER — TELEMEDICINE (OUTPATIENT)
Dept: FAMILY MEDICINE CLINIC | Facility: CLINIC | Age: 62
End: 2022-10-14
Payer: COMMERCIAL

## 2022-10-14 DIAGNOSIS — J01.10 ACUTE NON-RECURRENT FRONTAL SINUSITIS: ICD-10-CM

## 2022-10-14 DIAGNOSIS — J06.9 VIRAL URI WITH COUGH: Primary | ICD-10-CM

## 2022-10-14 PROCEDURE — 99213 OFFICE O/P EST LOW 20 MIN: CPT | Performed by: FAMILY MEDICINE

## 2022-10-14 RX ORDER — AZITHROMYCIN 250 MG/1
TABLET, FILM COATED ORAL
Qty: 6 TABLET | Refills: 0 | Status: SHIPPED | OUTPATIENT
Start: 2022-10-16 | End: 2022-10-21

## 2022-10-14 RX ORDER — BENZONATATE 100 MG/1
100 CAPSULE ORAL 3 TIMES DAILY PRN
Qty: 30 CAPSULE | Refills: 0 | Status: SHIPPED | OUTPATIENT
Start: 2022-10-14

## 2022-10-14 NOTE — PROGRESS NOTES
COVID-19 Outpatient Progress Note    Assessment/Plan:    Problem List Items Addressed This Visit    None     Visit Diagnoses     Viral URI with cough    -  Primary    Relevant Medications    benzonatate (TESSALON PERLES) 100 mg capsule    Acute non-recurrent frontal sinusitis        Relevant Medications    azithromycin (Zithromax) 250 mg tablet (Start on 10/16/2022)         Disposition:     After clarifying the patient's history, my suspicion for COVID-19 infection is very low  Negative COVID test Monday  Symptoms had started last Thursday   Patient's symptoms are likely due to viral etiology given chronicity and severity of symptoms  Discussed that upper respiratory tract infections are most often due to a viral etiology  Explained that if symptoms have been present for >10 days, or if signs and symptoms improved and then worsened within 10 days then infection is more likely to be bacterial and would require treatment with antibiotics  I explained to patient that viral infections will improve on their own with time and recommended supportive care with intranasal corticosteroids, saline rinse, as well as judicious use of nasal decongestant for severe symptoms  Return precautions given if symptoms do not improve, or if symptoms improve and worsen to consider antibiotic prescription  Pocket prescription for Z-pack provided, but encouraged patient to until at least 10 days and to not take if symptoms start to improve  I have spent 15 minutes directly with the patient  Greater than 50% of this time was spent in counseling/coordination of care regarding: instructions for management, patient and family education and impressions  Encounter provider: Jamey Phalen, MD     Provider located at: [place] [id number] [place] [address]  [phone]     Recent Visits  No visits were found meeting these conditions    Showing recent visits within past 7 days and meeting all other requirements  Today's Visits  Date Type Provider Dept   10/14/22 MD Keshawn Lockhart   Showing today's visits and meeting all other requirements  Future Appointments  No visits were found meeting these conditions  Showing future appointments within next 150 days and meeting all other requirements     This virtual check-in was done via [employer] and patient was informed that this is a secure, HIPAA-compliant platform  She agrees to proceed  Patient agrees to participate in a virtual check in via telephone or video visit instead of presenting to the office to address urgent/immediate medical needs  Patient is aware this is a billable service  She acknowledged consent and understanding of privacy and security of the video platform  The patient has agreed to participate and understands they can discontinue the visit at any time  After connecting through [facility], the patient was identified by name and date of birth  Kat Quiñones was informed that this was a telemedicine visit and that the exam was being conducted confidentially over secure lines  My office door was closed  No one else was in the room  Kat Quiñones acknowledged consent and understanding of privacy and security of the telemedicine visit  I informed the patient that I have reviewed her record in Epic and presented the opportunity for her to ask any questions regarding the visit today  The patient agreed to participate  Verification of patient location:  Patient is located in the following state in which I hold an active license: [place]    Subjective:   Kat Quiñones is a 58 y o  female who is concerned about COVID-19  Patient's symptoms include fatigue, nasal congestion, rhinorrhea, sore throat and cough  Patient denies fever, chills, malaise, anosmia, loss of taste, shortness of breath, chest tightness, abdominal pain, nausea, vomiting, diarrhea, myalgias and headaches       - Date of symptom onset: 10/6/2022      COVID-19 vaccination status: Fully vaccinated with booster    Exposure:   Contact with a person who is under investigation (PUI) for or who is positive for COVID-19 within the last 14 days?: No    She has been taking sudafed from off the shelf, Dayquil and Nyquil  No results found for: Zeny Christensen, [address] [facility], [place], [place], [address]    Review of Systems   Constitutional: Positive for fatigue  Negative for chills and fever  HENT: Positive for congestion, rhinorrhea, sinus pressure, sinus pain, sore throat and voice change  Respiratory: Positive for cough  Negative for chest tightness and shortness of breath  Gastrointestinal: Negative for abdominal pain, diarrhea, nausea and vomiting  Musculoskeletal: Negative for myalgias  Neurological: Negative for headaches  Current Outpatient Medications on File Prior to Visit   Medication Sig   • Nutritional Supplements (VITAMIN D MAINTENANCE PO) Take 2,000 Units by mouth daily   (Patient not taking: Reported on 7/14/2022)   • omeprazole (PriLOSEC) 40 MG capsule Take 1 capsule (40 mg total) by mouth daily   • rosuvastatin (CRESTOR) 5 mg tablet Take 1 tablet (5 mg total) by mouth daily       Objective:    LMP  (LMP Unknown)      Physical Exam  Constitutional:       General: She is not in acute distress  Appearance: Normal appearance  She is not ill-appearing or toxic-appearing  HENT:      Head: Normocephalic and atraumatic  Nose: Congestion present  Mouth/Throat:      Mouth: Mucous membranes are moist    Pulmonary:      Effort: Pulmonary effort is normal  No respiratory distress  Comments: Able to speak in complete sentences without difficulty   Neurological:      Mental Status: She is alert         Cresencio Lyle MD

## 2023-04-28 ENCOUNTER — TELEPHONE (OUTPATIENT)
Dept: FAMILY MEDICINE CLINIC | Facility: CLINIC | Age: 63
End: 2023-04-28

## 2023-04-28 NOTE — TELEPHONE ENCOUNTER
Ultrasound negative for Baker's cyst however structure could be a sebaceous cyst   If this is still causing her pain we can send her to orthopedic surgery for further evaluation

## 2023-05-02 DIAGNOSIS — M25.562 CHRONIC PAIN OF LEFT KNEE: Primary | ICD-10-CM

## 2023-05-02 DIAGNOSIS — G89.29 CHRONIC PAIN OF LEFT KNEE: Primary | ICD-10-CM

## 2023-06-23 ENCOUNTER — RA CDI HCC (OUTPATIENT)
Dept: OTHER | Facility: HOSPITAL | Age: 63
End: 2023-06-23

## 2023-06-23 NOTE — PROGRESS NOTES
[facility] 75  coding opportunities       Chart reviewed, no opportunity found: CHART REVIEWED, NO OPPORTUNITY FOUND        Patients Insurance        Commercial Insurance: Juan Leon

## 2023-07-17 ENCOUNTER — OFFICE VISIT (OUTPATIENT)
Dept: FAMILY MEDICINE CLINIC | Facility: CLINIC | Age: 63
End: 2023-07-17
Payer: COMMERCIAL

## 2023-07-17 VITALS
HEART RATE: 82 BPM | OXYGEN SATURATION: 96 % | TEMPERATURE: 98.5 F | WEIGHT: 201 LBS | BODY MASS INDEX: 31.55 KG/M2 | SYSTOLIC BLOOD PRESSURE: 124 MMHG | HEIGHT: 67 IN | DIASTOLIC BLOOD PRESSURE: 78 MMHG

## 2023-07-17 DIAGNOSIS — R06.09 DOE (DYSPNEA ON EXERTION): ICD-10-CM

## 2023-07-17 DIAGNOSIS — Z00.00 ANNUAL PHYSICAL EXAM: Primary | ICD-10-CM

## 2023-07-17 DIAGNOSIS — E78.2 MIXED HYPERLIPIDEMIA: ICD-10-CM

## 2023-07-17 DIAGNOSIS — M25.371 UNSTABLE ANKLE, RIGHT: ICD-10-CM

## 2023-07-17 DIAGNOSIS — I35.0 NONRHEUMATIC AORTIC VALVE STENOSIS: ICD-10-CM

## 2023-07-17 DIAGNOSIS — I49.3 PVC (PREMATURE VENTRICULAR CONTRACTION): ICD-10-CM

## 2023-07-17 DIAGNOSIS — E55.9 VITAMIN D DEFICIENCY: ICD-10-CM

## 2023-07-17 DIAGNOSIS — E78.01 FAMILIAL HYPERCHOLESTEROLEMIA: ICD-10-CM

## 2023-07-17 PROCEDURE — 99396 PREV VISIT EST AGE 40-64: CPT | Performed by: FAMILY MEDICINE

## 2023-07-17 RX ORDER — ROSUVASTATIN CALCIUM 5 MG/1
5 TABLET, COATED ORAL DAILY
Qty: 90 TABLET | Refills: 3 | Status: SHIPPED | OUTPATIENT
Start: 2023-07-17

## 2023-07-17 NOTE — PROGRESS NOTES
[address] [employer]    NAME: Ivan Garza  AGE: 58 y.o. SEX: female  : 1960     DATE: 2023     Assessment and Plan:     Problem List Items Addressed This Visit        Cardiovascular and Mediastinum    Nonrheumatic aortic valve stenosis    Relevant Orders    Ambulatory Referral to Cardiology    PVC (premature ventricular contraction)    Relevant Orders    Ambulatory Referral to Cardiology    TSH, 3rd generation with Free T4 reflex       Other    Mixed hyperlipidemia    Relevant Medications    rosuvastatin (CRESTOR) 5 mg tablet    Vitamin D deficiency    Relevant Orders    Vitamin D 25 hydroxy   Other Visit Diagnoses     Annual physical exam    -  Primary    Familial hypercholesterolemia        Relevant Medications    rosuvastatin (CRESTOR) 5 mg tablet    Other Relevant Orders    Lipid panel    Unstable ankle, right        Relevant Orders    XR ankle 3+ vw right    LUCAS (dyspnea on exertion)        Relevant Orders    Ambulatory Referral to Cardiology    CBC and Platelet    TSH, 3rd generation with Free T4 reflex        Right ankle non tender. Strength 5/5. We will obtain x-ray of the right ankle. Palpitations remain the same. Continue to monitor. Journal of her symptoms. Experiencing worsening LUCAS over the past year with increasing palpitations. Aortic murmur appreciated on exam today. We will have patient establish with cardiology    Immunizations and preventive care screenings were discussed with patient today. Appropriate education was printed on patient's after visit summary. Counseling:  Alcohol/drug use: discussed moderation in alcohol intake, the recommendations for healthy alcohol use, and avoidance of illicit drug use. Dental Health: discussed importance of regular tooth brushing, flossing, and dental visits.   Injury prevention: discussed safety/seat belts, safety helmets, smoke detectors, carbon dioxide detectors, and smoking near bedding or upholstery. Sexual health: discussed sexually transmitted diseases, partner selection, use of condoms, avoidance of unintended pregnancy, and contraceptive alternatives. Exercise: the importance of regular exercise/physical activity was discussed. Recommend exercise 3-5 times per week for at least 30 minutes. Return in about 1 year (around 7/17/2024). Chief Complaint:     Chief Complaint   Patient presents with   • Physical Exam      History of Present Illness:     Adult Annual Physical   Patient here for a comprehensive physical exam. The patient reports problems - right ankle pain    Occasionally feels like her right ankle is going to give out. Area of concerns on the inside of the ankle. Nontender. Only present when going down steps. Gets a pain     Diet and Physical Activity  Diet/Nutrition: well balanced diet. Exercise: moderate cardiovascular exercise. Limited by shortness of breath with exertion     Depression Screening  PHQ-2/9 Depression Screening         General Health  Sleep: sleeps well and gets 7-8 hours of sleep on average. Hearing: normal - bilateral.  Vision: no vision problems. Dental: regular dental visits. /GYN Health  Follows with obgyn      Review of Systems:     Review of Systems   Constitutional: Negative for fatigue and fever. HENT: Negative for sore throat. Eyes: Negative for visual disturbance. Respiratory: Negative for cough, chest tightness and shortness of breath. Shortness of breath with exertion   Cardiovascular: Positive for palpitations. Negative for chest pain and leg swelling. Gastrointestinal: Negative for abdominal pain, constipation, diarrhea and nausea. Endocrine: Negative for cold intolerance and heat intolerance. Genitourinary: Negative for flank pain. Musculoskeletal: Negative for back pain and neck pain. Right ankle instability and pain   Skin: Negative for rash.    Neurological: Negative for headaches. Psychiatric/Behavioral: Negative for behavioral problems and confusion. Past Medical History:     Past Medical History:   Diagnosis Date   • Chalazion    • Family hx of colon cancer requiring screening colonoscopy     sister rectal ca,brother  colon cancer,sister breast cancer   • GERD (gastroesophageal reflux disease)    • High cholesterol    • History of colon polyps    • Osteoarthritis    • Palpitations    • Papanicolaou smear 2017    NEG      Past Surgical History:     Past Surgical History:   Procedure Laterality Date   •  SECTION      x2   • COLONOSCOPY N/A 2016    Procedure: COLONOSCOPY;  Surgeon: Gentry Vazquez MD;  [facility]; Service:    • COLONOSCOPY W/ POLYPECTOMY     • MAMMO (HISTORICAL) Bilateral 2019    No evidence of malignancy   • OVARIAN CYST REMOVAL     • NM COLONOSCOPY FLX DX W/COLLJ SPEC WHEN PFRMD N/A 2017    Procedure: COLONOSCOPY;  Surgeon: Gentry Vazquez MD;  [facility]; Service: Colorectal   • NM ESOPHAGOGASTRODUODENOSCOPY TRANSORAL DIAGNOSTIC N/A 2017    Procedure: ESOPHAGOGASTRODUODENOSCOPY (EGD); Surgeon: Rosa Styles MD;  Location: [facility];   Service: Gastroenterology   • NM EXCISION GANGLION WRIST DORSAL/VOLAR PRIMARY Right 2018    Procedure: WRIST GANGLION CYST EXCISION;  Surgeon: Ambrocio Pena DO;  [facility];  Service: Orthopedics   • NM NEUROPLASTY &/TRANSPOS MEDIAN NRV CARPAL Arland Marquise Right 2018    Procedure: CARPAL TUNNEL RELEASE;  Surgeon: Ambrocio Pena DO;  [facility];  Service: Orthopedics   • RECTAL POLYPECTOMY      last assessed:  14   • TONSILLECTOMY        Social History:     Social History     Socioeconomic History   • Marital status: /Civil Union     Spouse name: None   • Number of children: 2   • Years of education: None   • Highest education level: None   Occupational History   • None   Tobacco Use   • Smoking status: Never   • Smokeless tobacco: Never   Substance and Sexual Activity   • Alcohol use: Yes     Comment: 2 drinks/night   • Drug use: No   • Sexual activity: Yes     Partners: Male     Birth control/protection: Male Sterilization, Post-menopausal   Other Topics Concern   • None   Social History Narrative   • None     Social Determinants of Health     Financial Resource Strain: Not on file   Food Insecurity: Not on file   Transportation Needs: Not on file   Physical Activity: Not on file   Stress: Not on file   Social Connections: Not on file   Intimate Partner Violence: Not on file   Housing Stability: Not on file      Family History:     Family History   Problem Relation Age of Onset   • Stroke Mother         stroke syndrome   • Stroke Father         stroke syndrome   • Diabetes type II Father    • Hypertension Father    • Diabetes Father    • Breast cancer Sister 36   • Rectal cancer Sister    • Colon cancer Brother    • Colon cancer Sister    • Prostate cancer Paternal Grandfather       Current Medications:     Current Outpatient Medications   Medication Sig Dispense Refill   • omeprazole (PriLOSEC) 40 MG capsule Take 1 capsule (40 mg total) by mouth daily 30 capsule 5   • rosuvastatin (CRESTOR) 5 mg tablet Take 1 tablet (5 mg total) by mouth daily 90 tablet 3   • Nutritional Supplements (VITAMIN D MAINTENANCE PO) Take 2,000 Units by mouth daily (Patient not taking: Reported on 7/17/2023)       No current facility-administered medications for this visit. Allergies: Allergies   Allergen Reactions   • Penicillins Hives      Physical Exam:     /78 (BP Location: Left arm, Patient Position: Sitting, Cuff Size: Large)   Pulse 82   Temp 98.5 °F (36.9 °C)   Ht 5' 7" (1.702 m)   Wt 91.2 kg (201 lb)   LMP  (LMP Unknown)   SpO2 96%   BMI 31.48 kg/m²     Physical Exam  Vitals and nursing note reviewed. Constitutional:       General: She is not in acute distress. Appearance: Normal appearance. She is well-developed. HENT:      Head: Normocephalic and atraumatic. Eyes:      Extraocular Movements: Extraocular movements intact. Conjunctiva/sclera: Conjunctivae normal.      Pupils: Pupils are equal, round, and reactive to light. Cardiovascular:      Rate and Rhythm: Normal rate and regular rhythm. Heart sounds: Murmur heard. Pulmonary:      Effort: Pulmonary effort is normal.      Breath sounds: Normal breath sounds. Abdominal:      General: Bowel sounds are normal.      Palpations: Abdomen is soft. Musculoskeletal:         General: Normal range of motion. Cervical back: Normal range of motion. Skin:     General: Skin is warm and dry. Neurological:      General: No focal deficit present. Mental Status: She is alert and oriented to person, place, and time.    Psychiatric:         Mood and Affect: Mood normal.         Speech: Speech normal.         Behavior: Behavior normal.          Bernice Snell MD  [facility] [address]

## 2023-07-17 NOTE — PATIENT INSTRUCTIONS
Ankle Exercises   WHAT YOU NEED TO KNOW:   What do I need to know about ankle exercises? Ankle exercises help strengthen your ankle and improve its function after injury. These are beginning exercises. Ask your healthcare provider if you need to see a physical therapist for more advanced exercises. What are some general guidelines for ankle exercises? Do these exercises 3 to 5 days a week, or as directed by your healthcare provider. Ask if you should do the exercises on each ankle. Do the exercises in the order that your healthcare provider recommends. This will help prevent swelling, chronic pain, and reinjury. Start with range of motion exercises. Then move to strengthening exercises, and finally to balancing exercises. Warm up before you do ankle exercises. Walk or ride a stationary bike for 5 to 10 minutes to prepare your ankle for movement. Stop if you feel pain. It is normal to feel some discomfort at first but you should not feel pain. Tell your doctor or physical therapist if you have pain while you exercise. Regular exercise will help decrease your discomfort over time. How do I perform range of motion exercises safely? Begin with range of motion exercises to improve flexibility. Ask your healthcare provider when you can progress to strengthening exercises. Ankle alphabet:  Sit on a chair so that your feet do not touch the floor. Use your big toe to write each letter of the alphabet. Use only your foot and ankle, and keep your movements small. Do 2 sets. Calf stretches:      Sitting calf stretches with a towel:  Sit on the floor with both legs out straight in front of you. Loop a towel around the ball of your injured foot. Grasp the ends of the towel and pull it toward you. Keep your leg and back straight. Do not lean forward as you pull the towel. Hold for 30 seconds. Then relax for 30 seconds. Do 2 sets of 10.          Standing calf stretches:  Stand facing a wall with the foot that is not injured forward and your knee slightly bent. Keep the leg with the injured foot straight and behind you with your toes pointed in slightly. With both heels flat on the floor, press your hips forward. Do not arch your back. Hold for 30 seconds, and then relax for 30 seconds. Do 2 sets of 10. Repeat with your leg bent. Do 2 sets of 10. How do I perform strengthening exercises safely? After you can perform range of motion exercises without pain, you may begin strengthening exercises. Ask your healthcare provider when you can progress to balancing exercises. Ankle movement in 4 directions:  Sit on the floor with your legs straight in front of you. Keep your heels on the floor for support. Dorsiflexion:  Begin with your toes pointing straight up. Pull your toes toward your body. Slowly return to the starting position. Do 3 sets of 5. Plantar flexion:  Begin with your toes pointing straight up. Push your toes away from your body. Slowly return to the starting position. Do 3 sets of 5. Inversion:  Begin with your toes pointing straight up. Push your toes inward, toward each other. Slowly return to the starting position. Do 3 sets of 5. Eversion:  Begin with your toes pointing straight up. Push your toes outward, away from each other. Slowly return to the starting position. Do 3 sets of 5. Toe curls with a towel:  Sit on a chair so that both of your feet are flat on the floor. Place a small towel on the floor in front of your injured foot. Grab the center of the towel with your toes and curl the towel toward you. Relax and repeat. Do 1 set of 5. [place] pick-ups:  Sit on a chair so that both of your feet are flat on the floor. Place 20 marbles on the floor in front of your injured foot. Use your toes to  one marble at a time and place it into a bowl. Repeat until you have picked up all the marbles. Do 1 set.      Heel raises:      Single leg heel raises:  Stand with your weight evenly on both feet. Hold on to a chair or a wall for balance. Lift the foot that is not injured off the floor so all your weight is placed on your injured foot. Raise the heel of your injured foot as high as you can. Slowly lower your heel to the floor. Do 1 set of 10. Double leg heel raises:  Stand with your weight evenly on both feet. Hold on to a chair or a wall for balance. Raise both of your heels as high as you can. Slowly lower your heels to the floor. Do 1 set of 10. Heel and toe walks:      Heel walks:  Begin in a standing position. Lift your toes off the floor and walk on your heels. Keep your toes lifted as high as possible. Do 2 sets of 10. Toe walks:  Begin in a standing position. Lift your heels off the floor and walk on the balls and toes of your feet. Keep your heels lifted as high as possible. Do 2 sets of 10. How do I perform a balance exercise safely? After you can perform strengthening exercises without pain, you may do this beginning balancing exercise. Ask your healthcare provider for more advanced balance exercises. Single leg stance:  Stand with your weight evenly on both feet, or hold on to a chair or a wall. Do not lean to the side. Lift the foot that is not injured off the floor so all your weight is placed on your injured foot. Balance on your injured foot. Ask your healthcare provider how long to hold this position. When should I call my doctor or physical therapist?   You have new pain, or your pain becomes worse. You have questions or concerns about your condition, care, or exercise program.    CARE AGREEMENT:   You have the right to help plan your care. Learn about your health condition and how it may be treated. Discuss treatment options with your healthcare providers to decide what care you want to receive. You always have the right to refuse treatment. The above information is an  only.  It is not intended as medical advice for individual conditions or treatments. Talk to your doctor, nurse or pharmacist before following any medical regimen to see if it is safe and effective for you. © Copyright [employer] 2022 Information is for End User's use only and may not be sold, redistributed or otherwise used for commercial purposes.

## 2023-07-24 ENCOUNTER — APPOINTMENT (OUTPATIENT)
Dept: LAB | Facility: MEDICAL CENTER | Age: 63
End: 2023-07-24
Payer: COMMERCIAL

## 2023-07-24 ENCOUNTER — APPOINTMENT (OUTPATIENT)
Dept: RADIOLOGY | Facility: MEDICAL CENTER | Age: 63
End: 2023-07-24
Payer: COMMERCIAL

## 2023-07-24 DIAGNOSIS — I49.3 PVC (PREMATURE VENTRICULAR CONTRACTION): ICD-10-CM

## 2023-07-24 DIAGNOSIS — R06.09 DOE (DYSPNEA ON EXERTION): ICD-10-CM

## 2023-07-24 DIAGNOSIS — E78.01 FAMILIAL HYPERCHOLESTEROLEMIA: ICD-10-CM

## 2023-07-24 DIAGNOSIS — E55.9 VITAMIN D DEFICIENCY: ICD-10-CM

## 2023-07-24 DIAGNOSIS — M25.371 UNSTABLE ANKLE, RIGHT: ICD-10-CM

## 2023-07-24 LAB
25(OH)D3 SERPL-MCNC: 41.7 NG/ML (ref 30–100)
CHOLEST SERPL-MCNC: 253 MG/DL
ERYTHROCYTE [DISTWIDTH] IN BLOOD BY AUTOMATED COUNT: 12.8 % (ref 11.6–15.1)
HCT VFR BLD AUTO: 44.1 % (ref 34.8–46.1)
HDLC SERPL-MCNC: 40 MG/DL
HGB BLD-MCNC: 14.8 G/DL (ref 11.5–15.4)
LDLC SERPL CALC-MCNC: 169 MG/DL (ref 0–100)
MCH RBC QN AUTO: 31.7 PG (ref 26.8–34.3)
MCHC RBC AUTO-ENTMCNC: 33.6 G/DL (ref 31.4–37.4)
MCV RBC AUTO: 94 FL (ref 82–98)
NONHDLC SERPL-MCNC: 213 MG/DL
PLATELET # BLD AUTO: 205 THOUSANDS/UL (ref 149–390)
PMV BLD AUTO: 11.3 FL (ref 8.9–12.7)
RBC # BLD AUTO: 4.67 MILLION/UL (ref 3.81–5.12)
TRIGL SERPL-MCNC: 221 MG/DL
TSH SERPL DL<=0.05 MIU/L-ACNC: 2.74 UIU/ML (ref 0.45–4.5)
WBC # BLD AUTO: 6.12 THOUSAND/UL (ref 4.31–10.16)

## 2023-07-24 PROCEDURE — 80061 LIPID PANEL: CPT

## 2023-07-24 PROCEDURE — 84443 ASSAY THYROID STIM HORMONE: CPT

## 2023-07-24 PROCEDURE — 73610 X-RAY EXAM OF ANKLE: CPT

## 2023-07-24 PROCEDURE — 85027 COMPLETE CBC AUTOMATED: CPT

## 2023-07-24 PROCEDURE — 82306 VITAMIN D 25 HYDROXY: CPT

## 2023-07-24 PROCEDURE — 36415 COLL VENOUS BLD VENIPUNCTURE: CPT

## 2023-08-11 DIAGNOSIS — M87.073 AVASCULAR NECROSIS OF BONE OF ANKLE (HCC): ICD-10-CM

## 2023-08-11 DIAGNOSIS — M25.371 RIGHT ANKLE INSTABILITY: Primary | ICD-10-CM

## 2023-08-25 DIAGNOSIS — K21.9 GASTROESOPHAGEAL REFLUX DISEASE: ICD-10-CM

## 2023-08-25 RX ORDER — OMEPRAZOLE 40 MG/1
40 CAPSULE, DELAYED RELEASE ORAL DAILY
Qty: 30 CAPSULE | Refills: 5 | OUTPATIENT
Start: 2023-08-25

## 2023-09-25 ENCOUNTER — HOSPITAL ENCOUNTER (OUTPATIENT)
Dept: RADIOLOGY | Facility: HOSPITAL | Age: 63
Discharge: HOME | End: 2023-09-25
Attending: FAMILY MEDICINE
Payer: COMMERCIAL

## 2023-09-25 ENCOUNTER — HOSPITAL ENCOUNTER (OUTPATIENT)
Dept: MRI IMAGING | Facility: HOSPITAL | Age: 63
Discharge: HOME | End: 2023-09-25
Attending: FAMILY MEDICINE
Payer: COMMERCIAL

## 2023-09-25 DIAGNOSIS — M25.371 RIGHT ANKLE INSTABILITY: ICD-10-CM

## 2023-09-25 DIAGNOSIS — M87.073 AVASCULAR NECROSIS OF BONE OF ANKLE (HCC): ICD-10-CM

## 2023-09-25 PROCEDURE — 73721 MRI JNT OF LWR EXTRE W/O DYE: CPT

## 2023-09-25 PROCEDURE — G1004 CDSM NDSC: HCPCS

## 2023-09-27 ENCOUNTER — CONSULT (OUTPATIENT)
Dept: CARDIOLOGY CLINIC | Facility: CLINIC | Age: 63
End: 2023-09-27
Payer: COMMERCIAL

## 2023-09-27 VITALS
HEIGHT: 67 IN | HEART RATE: 79 BPM | WEIGHT: 202 LBS | DIASTOLIC BLOOD PRESSURE: 78 MMHG | BODY MASS INDEX: 31.71 KG/M2 | SYSTOLIC BLOOD PRESSURE: 132 MMHG | OXYGEN SATURATION: 98 %

## 2023-09-27 DIAGNOSIS — R06.09 DOE (DYSPNEA ON EXERTION): ICD-10-CM

## 2023-09-27 DIAGNOSIS — I49.3 PVC (PREMATURE VENTRICULAR CONTRACTION): ICD-10-CM

## 2023-09-27 DIAGNOSIS — E78.2 MIXED HYPERLIPIDEMIA: Primary | ICD-10-CM

## 2023-09-27 DIAGNOSIS — I35.0 NONRHEUMATIC AORTIC VALVE STENOSIS: ICD-10-CM

## 2023-09-27 PROCEDURE — 99244 OFF/OP CNSLTJ NEW/EST MOD 40: CPT | Performed by: INTERNAL MEDICINE

## 2023-09-28 PROCEDURE — 93000 ELECTROCARDIOGRAM COMPLETE: CPT | Performed by: INTERNAL MEDICINE

## 2023-09-28 NOTE — PROGRESS NOTES
Consultation - Cardiology   El Sanders 61 y.o. female MRN: [id number]  Unit/Bed#:  Encounter: [id number]  Physician Requesting Consult: No att. providers found  Reason for Consult / Principal Problem: Cardiac evaluation    Assessment:  1. Aortic stenosis  2. LUCAS  3. Hypercholesterolemia  4. PVCs      Plan:  She has seen cardiology in the past for similar symptoms. Stress ECHO  2015  - 6:46 Ted , no ischemia  Holter 10/2020 - NSR, 80 PVC's    I will repeat an ECHO. I have reviewed her medications and made no changes. I encouraged her to get more active and try to get her weight down. RTO 6 months. History of Present Illness     HPI: El Sanders is a 61y.o. year old female. She has had symptomatic PVCs in the past with Holter showing < 2 % burden. Prior echo showed increased flow across her AV, possible mild AS. LDL is 169, she was recently put on statin therapy. She c/o getting SOB when going up hills. She denies CP. Her SOB is not new for her. She denies LE edema. She denies smoking, HTN, DM. /78  Wt 202 lbs    ECG today - NSR, PRWP.             Review of Systems:    Alert awake oriented, comfortable, denies any complaints  No fevers chills nausea vomiting  No weakness, dizziness, seizures  positive for - shortness of breath  Denies any palpitations, chest pain, diaphoresis  Denies leg edema, pain or paresthesias  Denies any skin rashes  Denies abdominal pain, bloody stools, masses  Denies any depression or suicidal ideations      Historical Information   Past Medical History:   Diagnosis Date   • Chalazion    • Family hx of colon cancer requiring screening colonoscopy     sister rectal ca,brother  colon cancer,sister breast cancer   • GERD (gastroesophageal reflux disease)    • High cholesterol    • History of colon polyps    • Osteoarthritis    • Palpitations    • Papanicolaou smear 2017    NEG     Past Surgical History:   Procedure Laterality Date   •  SECTION      x2   • COLONOSCOPY N/A 6/17/2016    Procedure: COLONOSCOPY;  Surgeon: Osmin Jamison MD;  [facility]; Service:    • COLONOSCOPY W/ POLYPECTOMY     • MAMMO (HISTORICAL) Bilateral 01/22/2019    No evidence of malignancy   • OVARIAN CYST REMOVAL     • PA COLONOSCOPY FLX DX W/COLLJ SPEC WHEN PFRMD N/A 12/1/2017    Procedure: COLONOSCOPY;  Surgeon: Osmin Jamison MD;  [facility]; Service: Colorectal   • PA ESOPHAGOGASTRODUODENOSCOPY TRANSORAL DIAGNOSTIC N/A 12/1/2017    Procedure: ESOPHAGOGASTRODUODENOSCOPY (EGD); Surgeon: Jessica Simmons MD;  [facility]; Service: Gastroenterology   • PA EXCISION GANGLION WRIST DORSAL/VOLAR PRIMARY Right 11/13/2018    Procedure: WRIST GANGLION CYST EXCISION;  Surgeon: Parag Toledo DO;  [facility];  Service: Orthopedics   • PA NEUROPLASTY &/TRANSPOS MEDIAN NRV CARPAL Andrea Maite Right 11/13/2018    Procedure: CARPAL TUNNEL RELEASE;  Surgeon: Parag Toledo DO;  [facility];  Service: Orthopedics   • RECTAL POLYPECTOMY      last assessed:  5/6/14   • TONSILLECTOMY       Social History     Substance and Sexual Activity   Alcohol Use Yes    Comment: 2 drinks/night     Social History     Substance and Sexual Activity   Drug Use No     Social History     Tobacco Use   Smoking Status Never   Smokeless Tobacco Never     Family History: non-contributory    Meds/Allergies   all current active meds have been reviewed  Allergies   Allergen Reactions   • Penicillins Hives       Objective   Vitals: Blood pressure 132/78, pulse 79, height 5' 7" (1.702 m), weight 91.6 kg (202 lb), SpO2 98 %, not currently breastfeeding., Body mass index is 31.64 kg/m². ,   Weight (last 2 days)     Date/Time Weight    09/27/23 1445 91.6 (202)                    Physical Exam:  GEN: Gokul Merchant appears well, alert and oriented x 3, pleasant and cooperative   HEENT: pupils equal, round, and reactive to light; extraocular muscles intact  NECK: supple, no carotid bruits   HEART: regular rhythm, normal S1 and S2, no murmurs, clicks, gallops or rubs   LUNGS: clear to auscultation bilaterally; no wheezes, rales, or rhonchi   ABDOMEN: normal bowel sounds, soft, no tenderness, no distention  EXTREMITIES: peripheral pulses normal; no clubbing, cyanosis, or edema  NEURO: no focal findings   SKIN: normal without suspicious lesions on exposed skin    Lab Results:   No visits with results within 1 Day(s) from this visit. Latest known visit with results is:   Appointment on 07/24/2023   Component Date Value Ref Range Status   • Cholesterol 07/24/2023 253 (H)  See Comment mg/dL Final    Cholesterol:         Pediatric <18 Years        Desirable          <170 mg/dL      Borderline High    170-199 mg/dL      High               >=200 mg/dL        Adult >=18 Years            Desirable         <200 mg/dL      Borderline High   200-239 mg/dL      High              >239 mg/dL     • Triglycerides 07/24/2023 221 (H)  See Comment mg/dL Final    Triglyceride:     0-9Y            <75mg/dL     10Y-17Y         <90 mg/dL       >=18Y     Normal          <150 mg/dL     Borderline High 150-199 mg/dL     High            200-499 mg/dL        Very High       >499 mg/dL    Specimen collection should occur prior to N-Acetylcysteine or Metamizole administration due to the potential for falsely depressed results. • HDL, Direct 07/24/2023 40 (L)  >=50 mg/dL Final    Specimen collection should occur prior to Metamizole administration due to the potential for falsley depressed results. • LDL Calculated 07/24/2023 169 (H)  0 - 100 mg/dL Final    LDL Cholesterol:     Optimal           <100 mg/dl     Near Optimal      100-129 mg/dl     Above Optimal       Borderline High 130-159 mg/dl       High            160-189 mg/dl       Very High       >189 mg/dl         This screening LDL is a calculated result.    It does not have the accuracy of the Direct Measured LDL in the monitoring of patients with hyperlipidemia and/or statin therapy. Direct Measure LDL (OQD923) must be ordered separately in these patients. • Non-HDL-Chol (CHOL-HDL) 07/24/2023 213  mg/dl Final   • Vit D, 25-Hydroxy 07/24/2023 41.7  30.0 - 100.0 ng/mL Final    Vitamin D guidelines established by Clinical Guidelines Subcommittee  of the Endocrine Society Task Force, 2011    Deficiency <20ng/ml   Insufficiency 20-30ng/ml   Sufficient  ng/ml    • WBC 07/24/2023 6.12  4.31 - 10.16 Thousand/uL Final   • RBC 07/24/2023 4.67  3.81 - 5.12 Million/uL Final   • Hemoglobin 07/24/2023 14.8  11.5 - 15.4 g/dL Final   • Hematocrit 07/24/2023 44.1  34.8 - 46.1 % Final   • MCV 07/24/2023 94  82 - 98 fL Final   • MCH 07/24/2023 31.7  26.8 - 34.3 pg Final   • MCHC 07/24/2023 33.6  31.4 - 37.4 g/dL Final   • RDW 07/24/2023 12.8  11.6 - 15.1 % Final   • Platelets 41/63/7544 205  149 - 390 Thousands/uL Final   • MPV 07/24/2023 11.3  8.9 - 12.7 fL Final   • TSH 3RD GENERATON 07/24/2023 2.737  0.450 - 4.500 uIU/mL Final    The recommended reference ranges for TSH during pregnancy are as follows:   First trimester 0.1 to 2.5 uIU/mL   Second trimester  0.2 to 3.0 uIU/mL   Third trimester 0.3 to 3.0 uIU/m    Note: Normal ranges may not apply to patients who are transgender, non-binary, or whose legal sex, sex at birth, and gender identity differ. Adult TSH (3rd generation) reference range follows the recommended guidelines of the American Thy[employer] Association, January, 2020. Invalid input(s): "LABALBU"          Imaging: I have personally reviewed pertinent reports.

## 2023-10-08 DIAGNOSIS — K21.9 GASTROESOPHAGEAL REFLUX DISEASE: ICD-10-CM

## 2023-10-09 RX ORDER — OMEPRAZOLE 40 MG/1
40 CAPSULE, DELAYED RELEASE ORAL DAILY
Qty: 30 CAPSULE | Refills: 5 | Status: SHIPPED | OUTPATIENT
Start: 2023-10-09

## 2023-11-01 DIAGNOSIS — K21.9 GASTROESOPHAGEAL REFLUX DISEASE: ICD-10-CM

## 2023-11-01 RX ORDER — OMEPRAZOLE 40 MG/1
40 CAPSULE, DELAYED RELEASE ORAL DAILY
Qty: 90 CAPSULE | Refills: 1 | Status: SHIPPED | OUTPATIENT
Start: 2023-11-01

## 2023-11-09 ENCOUNTER — HOSPITAL ENCOUNTER (OUTPATIENT)
Dept: NON INVASIVE DIAGNOSTICS | Facility: MEDICAL CENTER | Age: 63
Discharge: HOME | End: 2023-11-09
Payer: COMMERCIAL

## 2023-11-09 VITALS
HEART RATE: 79 BPM | SYSTOLIC BLOOD PRESSURE: 132 MMHG | DIASTOLIC BLOOD PRESSURE: 78 MMHG | BODY MASS INDEX: 31.71 KG/M2 | WEIGHT: 202 LBS | HEIGHT: 67 IN

## 2023-11-09 DIAGNOSIS — I35.0 NONRHEUMATIC AORTIC VALVE STENOSIS: ICD-10-CM

## 2023-11-09 DIAGNOSIS — R06.09 DOE (DYSPNEA ON EXERTION): ICD-10-CM

## 2023-11-09 LAB
AORTIC ROOT: 3.2 CM
AORTIC VALVE MEAN VELOCITY: 12.8 M/S
APICAL FOUR CHAMBER EJECTION FRACTION: 70 %
ASCENDING AORTA: 3.3 CM
AV AREA BY CONTINUOUS VTI: 2 CM2
AV AREA PEAK VELOCITY: 1.9 CM2
AV LVOT MEAN GRADIENT: 3 MMHG
AV LVOT PEAK GRADIENT: 5 MMHG
AV MEAN GRADIENT: 8 MMHG
AV PEAK GRADIENT: 15 MMHG
AV VALVE AREA: 1.98 CM2
AV VELOCITY RATIO: 0.59
DOP CALC AO PEAK VEL: 1.91 M/S
DOP CALC AO VTI: 39.07 CM
DOP CALC LVOT AREA: 3.14 CM2
DOP CALC LVOT CARDIAC INDEX: 2.48 L/MIN/M2
DOP CALC LVOT CARDIAC OUTPUT: 5.02 L/MIN
DOP CALC LVOT DIAMETER: 2 CM
DOP CALC LVOT PEAK VEL VTI: 24.62 CM
DOP CALC LVOT PEAK VEL: 1.13 M/S
DOP CALC LVOT STROKE INDEX: 36.9 ML/M2
DOP CALC LVOT STROKE VOLUME: 77.31
E WAVE DECELERATION TIME: 355 MS
E/A RATIO: 0.57
FRACTIONAL SHORTENING: 38 (ref 28–44)
INTERVENTRICULAR SEPTUM IN DIASTOLE (PARASTERNAL SHORT AXIS VIEW): 1.1 CM
INTERVENTRICULAR SEPTUM: 1.1 CM (ref 0.6–1.1)
LAAS-AP2: 19.8 CM2
LAAS-AP4: 16.3 CM2
LEFT ATRIUM SIZE: 3.6 CM
LEFT ATRIUM VOLUME (MOD BIPLANE): 53 ML
LEFT ATRIUM VOLUME INDEX (MOD BIPLANE): 26.1 ML/M2
LEFT INTERNAL DIMENSION IN SYSTOLE: 2.6 CM (ref 2.1–4)
LEFT VENTRICULAR INTERNAL DIMENSION IN DIASTOLE: 4.2 CM (ref 3.5–6)
LEFT VENTRICULAR POSTERIOR WALL IN END DIASTOLE: 1 CM
LEFT VENTRICULAR STROKE VOLUME: 54 ML
LVSV (TEICH): 54 ML
MV E'TISSUE VEL-LAT: 11 CM/S
MV E'TISSUE VEL-SEP: 7 CM/S
MV PEAK A VEL: 1.01 M/S
MV PEAK E VEL: 58 CM/S
MV STENOSIS PRESSURE HALF TIME: 103 MS
MV VALVE AREA P 1/2 METHOD: 2.14
RIGHT ATRIUM AREA SYSTOLE A4C: 10.8 CM2
RIGHT VENTRICLE ID DIMENSION: 2.8 CM
SL CV LEFT ATRIUM LENGTH A2C: 6.1 CM
SL CV LV EF: 60
SL CV PED ECHO LEFT VENTRICLE DIASTOLIC VOLUME (MOD BIPLANE) 2D: 78 ML
SL CV PED ECHO LEFT VENTRICLE SYSTOLIC VOLUME (MOD BIPLANE) 2D: 24 ML
TR MAX PG: 7 MMHG
TR PEAK VELOCITY: 1.3 M/S
TRICUSPID ANNULAR PLANE SYSTOLIC EXCURSION: 2.5 CM
TRICUSPID VALVE PEAK REGURGITATION VELOCITY: 1.32 M/S

## 2023-11-09 PROCEDURE — 93306 TTE W/DOPPLER COMPLETE: CPT

## 2023-11-09 PROCEDURE — 93306 TTE W/DOPPLER COMPLETE: CPT | Performed by: INTERNAL MEDICINE

## 2023-11-27 ENCOUNTER — TELEPHONE (OUTPATIENT)
Dept: CARDIOLOGY CLINIC | Facility: CLINIC | Age: 63
End: 2023-11-27

## 2023-11-27 NOTE — TELEPHONE ENCOUNTER
P/c'd she would like the results of Echo. Also has a dental appt this afternoon. She broke a tooth and is in pain. She wants to know if she is cleared to get IV sedation. She has a fear and that is the only way she will have a tooth pulled.     Please advise

## 2023-12-30 ENCOUNTER — OFFICE VISIT (OUTPATIENT)
Dept: URGENT CARE | Facility: MEDICAL CENTER | Age: 63
End: 2023-12-30
Payer: COMMERCIAL

## 2023-12-30 ENCOUNTER — NURSE TRIAGE (OUTPATIENT)
Dept: OTHER | Facility: OTHER | Age: 63
End: 2023-12-30

## 2023-12-30 VITALS
RESPIRATION RATE: 18 BRPM | BODY MASS INDEX: 31.71 KG/M2 | OXYGEN SATURATION: 99 % | DIASTOLIC BLOOD PRESSURE: 72 MMHG | WEIGHT: 202 LBS | HEIGHT: 67 IN | SYSTOLIC BLOOD PRESSURE: 122 MMHG | TEMPERATURE: 98.9 F | HEART RATE: 90 BPM

## 2023-12-30 DIAGNOSIS — K04.7 DENTAL ABSCESS: Primary | ICD-10-CM

## 2023-12-30 PROCEDURE — 99213 OFFICE O/P EST LOW 20 MIN: CPT | Performed by: PHYSICIAN ASSISTANT

## 2023-12-30 RX ORDER — CLINDAMYCIN HYDROCHLORIDE 300 MG/1
300 CAPSULE ORAL 4 TIMES DAILY
Qty: 28 CAPSULE | Refills: 0 | Status: SHIPPED | OUTPATIENT
Start: 2023-12-30 | End: 2024-01-06

## 2023-12-30 RX ORDER — HYDROCODONE BITARTRATE AND ACETAMINOPHEN 5; 325 MG/1; MG/1
1 TABLET ORAL EVERY 6 HOURS PRN
COMMUNITY
Start: 2023-12-14

## 2023-12-30 NOTE — PROGRESS NOTES
[facility]        NAME: Bianca Francisco is a 63 y.o. female  : 1960    MRN: [id number]  DATE: 2023  TIME: 1:19 PM    Assessment and Plan   Dental abscess [K04.7]  1. Dental abscess              Patient Instructions     Dental abscess  Clindamycin  Follow up with PCP in 3-5 days.  Proceed to  ER if symptoms worsen.    Chief Complaint     Chief Complaint   Patient presents with    Dental Pain     Patient had tooth removed 23; concerned because there is a lump near where the tooth was pulled; patient states the tooth was infected previously and was treated with ZPACK          History of Present Illness       63-year-old female who presents complaining of lump next to dental extraction.  Patient states that she had an extraction done by the dental surgeon recently but over the last 3 days she has developed a painful swollen area on the inside of the gum next to the extracted tooth.  Denies fevers, chills.    Dental Pain         Review of Systems   Review of Systems   Constitutional: Negative.    HENT:  Positive for dental problem.    Eyes: Negative.    Respiratory: Negative.  Negative for cough, chest tightness, shortness of breath, wheezing and stridor.    Cardiovascular: Negative.  Negative for chest pain, palpitations and leg swelling.         Current Medications       Current Outpatient Medications:     HYDROcodone-acetaminophen (NORCO) 5-325 mg per tablet, Take 1 tablet by mouth every 6 (six) hours as needed for pain, Disp: , Rfl:     Nutritional Supplements (VITAMIN D MAINTENANCE PO), Take 2,000 Units by mouth daily, Disp: , Rfl:     omeprazole (PriLOSEC) 40 MG capsule, TAKE 1 CAPSULE (40 MG TOTAL) BY MOUTH DAILY., Disp: 90 capsule, Rfl: 1    rosuvastatin (CRESTOR) 5 mg tablet, Take 1 tablet (5 mg total) by mouth daily, Disp: 90 tablet, Rfl: 3    Current Allergies     Allergies as of 2023 - Reviewed 2023   Allergen Reaction Noted    Penicillins Hives 2016             The following portions of the patient's history were reviewed and updated as appropriate: allergies, current medications, past family history, past medical history, past social history, past surgical history and problem list.     Past Medical History:   Diagnosis Date    Chalazion     Family hx of colon cancer requiring screening colonoscopy     sister rectal ca,brother  colon cancer,sister breast cancer    GERD (gastroesophageal reflux disease)     High cholesterol     History of colon polyps     Osteoarthritis     Palpitations     Papanicolaou smear 2017    NEG       Past Surgical History:   Procedure Laterality Date     SECTION      x2    COLONOSCOPY N/A 2016    Procedure: COLONOSCOPY;  Surgeon: Sandeep Mclean MD;  Location: [facility];  Service:     COLONOSCOPY W/ POLYPECTOMY      MAMMO (HISTORICAL) Bilateral 2019    No evidence of malignancy    OVARIAN CYST REMOVAL      PA COLONOSCOPY FLX DX W/COLLJ SPEC WHEN PFRMD N/A 2017    Procedure: COLONOSCOPY;  Surgeon: Sandeep Mclean MD;  Location: [facility];  Service: Colorectal    PA ESOPHAGOGASTRODUODENOSCOPY TRANSORAL DIAGNOSTIC N/A 2017    Procedure: ESOPHAGOGASTRODUODENOSCOPY (EGD);  Surgeon: Jaiden Britt MD;  Location: [facility];  Service: Gastroenterology    PA EXCISION GANGLION WRIST DORSAL/VOLAR PRIMARY Right 2018    Procedure: WRIST GANGLION CYST EXCISION;  Surgeon: Monico Salgado DO;  Location: [facility];  Service: Orthopedics    PA NEUROPLASTY &/TRANSPOS MEDIAN NRV CARPAL TUNNE Right 2018    Procedure: CARPAL TUNNEL RELEASE;  Surgeon: Monico Salgado DO;  Location[facility];  Service: Orthopedics    RECTAL POLYPECTOMY      last assessed:  14    TONSILLECTOMY         Family History   Problem Relation Age of Onset    Stroke Mother         stroke syndrome    Stroke Father         stroke syndrome    Diabetes type II Father     Hypertension Father     Diabetes Father     Breast cancer  "Sister 40    Rectal cancer Sister     Colon cancer Brother     Colon cancer Sister     Prostate cancer Paternal Grandfather          Medications have been verified.        Objective   /72   Pulse 90   Temp 98.9 °F (37.2 °C) (Temporal)   Resp 18   Ht 5' 7\" (1.702 m)   Wt 91.6 kg (202 lb)   LMP  (LMP Unknown)   SpO2 99%   BMI 31.64 kg/m²        Physical Exam     Physical Exam  Constitutional:       Appearance: Normal appearance. She is well-developed.   HENT:      Head: Normocephalic and atraumatic.      Right Ear: Tympanic membrane, ear canal and external ear normal. There is no impacted cerumen.      Left Ear: Tympanic membrane, ear canal and external ear normal. There is no impacted cerumen.      Nose: Nose normal.      Mouth/Throat:      Pharynx: No oropharyngeal exudate.     Cardiovascular:      Rate and Rhythm: Normal rate and regular rhythm.      Heart sounds: Normal heart sounds.   Pulmonary:      Effort: Pulmonary effort is normal. No respiratory distress.      Breath sounds: Normal breath sounds. No wheezing or rales.   Chest:      Chest wall: No tenderness.   Abdominal:      Tenderness: There is no guarding.   Musculoskeletal:      Cervical back: Normal range of motion and neck supple.   Lymphadenopathy:      Cervical: No cervical adenopathy.   Neurological:      Mental Status: She is alert.                   "

## 2023-12-30 NOTE — TELEPHONE ENCOUNTER
Regarding: Post oral surgery issue  ----- Message from Ritu Bravo sent at 12/30/2023  9:43 AM EST -----  I had oral surgery with [facility] on 12/14/23 and having issues and they prescribed Zpack for post issues and they prescribed an additional Zpack but I am still having issues. They are closed and I thought to call my primary doctor.

## 2023-12-30 NOTE — TELEPHONE ENCOUNTER
"Reason for Disposition  • Painless lump in mouth    Answer Assessment - Initial Assessment Questions  1. SYMPTOM: \"What's the main symptom you're concerned about?\" (e.g., dry mouth. chapped lips, lump)      Pain at lump where tooth was pulled   2. ONSET: \"When did the lump  start?\"      Since surgery 12/14  3. PAIN: \"Is there any pain?\" If Yes, ask: \"How bad is it?\" (Scale: 1-10; mild, moderate, severe)      No pain unless she pushes on it   4. CAUSE: \"What do you think is causing the symptoms?\"      Unsure   5. OTHER SYMPTOMS: \"Do you have any other symptoms?\" (e.g., fever, sore throat, toothache, swelling)  No fever or chills         Patient states she had oral surgery 12/14 at . She had #14 tooth pulled (top left). Since then she has had a lump and states it hurts when she pushes on it. She states she was given zpac twice by  oral surgeon for broken tooth    Protocols used: Mouth Symptoms-ADULT-    "

## 2024-02-05 ENCOUNTER — ANNUAL EXAM (OUTPATIENT)
Dept: GYNECOLOGY | Facility: CLINIC | Age: 64
End: 2024-02-05
Payer: COMMERCIAL

## 2024-02-05 VITALS
HEIGHT: 67 IN | SYSTOLIC BLOOD PRESSURE: 155 MMHG | BODY MASS INDEX: 31.71 KG/M2 | WEIGHT: 202 LBS | DIASTOLIC BLOOD PRESSURE: 108 MMHG

## 2024-02-05 DIAGNOSIS — Z01.419 ROUTINE GYNECOLOGICAL EXAMINATION: ICD-10-CM

## 2024-02-05 DIAGNOSIS — N39.3 SUI (STRESS URINARY INCONTINENCE, FEMALE): ICD-10-CM

## 2024-02-05 DIAGNOSIS — Z11.51 SCREENING FOR HPV (HUMAN PAPILLOMAVIRUS): ICD-10-CM

## 2024-02-05 DIAGNOSIS — Z12.31 SCREENING MAMMOGRAM FOR BREAST CANCER: Primary | ICD-10-CM

## 2024-02-05 PROCEDURE — S0612 ANNUAL GYNECOLOGICAL EXAMINA: HCPCS | Performed by: OBSTETRICS & GYNECOLOGY

## 2024-02-05 PROCEDURE — G0476 HPV COMBO ASSAY CA SCREEN: HCPCS | Performed by: OBSTETRICS & GYNECOLOGY

## 2024-02-05 PROCEDURE — G0145 SCR C/V CYTO,THINLAYER,RESCR: HCPCS | Performed by: OBSTETRICS & GYNECOLOGY

## 2024-02-05 NOTE — PROGRESS NOTES
Assessment/Plan:    Normal breast and GYN exam  Normal Pap smear negative HPV 2019  Recent onset of mild REFUGIO  Recent weight gain  Normal mammogram 2023  Family history of colon and breast cancer  Colonoscopy with polyp 2022.  Due for repeat in 3 years    Plan: Check Pap smear.  Recommend losing 15 to 20 pounds.  Recommend emptying of bladder on a regular basis.  Recommend Kegels.  Rx mammogram.    Subjective:          Patient ID: Bianca Francisco is a 63 y.o. female presents to the office for annual exam complaining of recent onset of occasional REFUGIO.  Has gained weight over the last year.  Just started to bike ride with her .  Was not exercising regularly.  Not emptying her bladder on a regular basis.  Denies any pelvic pain vaginal bleeding or dyspareunia.  Denies any breast or bowel problems.  No change in family history.  Sister breast cancer another sister and mother (colon cancer).  Working full-time.  Medications reviewed.      Review of Systems   Constitutional: Negative.  Negative for fatigue, fever and unexpected weight change.   HENT: Negative.     Eyes: Negative.    Respiratory: Negative.  Negative for chest tightness, shortness of breath, wheezing and stridor.    Cardiovascular: Negative.  Negative for chest pain, palpitations and leg swelling.   Gastrointestinal: Negative.  Negative for abdominal pain, blood in stool, diarrhea, nausea, rectal pain and vomiting.   Endocrine: Negative.    Genitourinary:  Negative for dysuria, frequency, vaginal bleeding, vaginal discharge and vaginal pain.        Occasional REFUGIO   Musculoskeletal: Negative.    Skin: Negative.    Allergic/Immunologic: Negative.    Neurological: Negative.    Hematological: Negative.    Psychiatric/Behavioral: Negative.     All other systems reviewed and are negative.        Objective:      LMP  (LMP Unknown)          Physical Exam  Constitutional:       Appearance: She is well-developed. She is obese.   HENT:       Head: Normocephalic and atraumatic.   Neck:      Thyroid: No thyromegaly.      Trachea: No tracheal deviation.   Cardiovascular:      Rate and Rhythm: Normal rate and regular rhythm.      Heart sounds: Normal heart sounds.   Pulmonary:      Effort: Pulmonary effort is normal. No respiratory distress.      Breath sounds: Normal breath sounds. No stridor. No wheezing or rales.   Chest:      Chest wall: No tenderness.   Breasts:     Breasts are symmetrical.      Right: No inverted nipple, mass, nipple discharge, skin change or tenderness.      Left: No inverted nipple, mass, nipple discharge, skin change or tenderness.   Abdominal:      General: Bowel sounds are normal. There is no distension.      Palpations: Abdomen is soft. There is no mass.      Tenderness: There is no abdominal tenderness. There is no guarding or rebound.      Hernia: No hernia is present. There is no hernia in the left inguinal area.   Genitourinary:     Labia:         Right: No rash, tenderness, lesion or injury.         Left: No rash, tenderness, lesion or injury.       Vagina: Normal. No signs of injury and foreign body. No vaginal discharge, erythema, tenderness or bleeding.      Cervix: No cervical motion tenderness, discharge or friability.      Uterus: Not deviated, not enlarged, not fixed and not tender.       Adnexa:         Right: No mass, tenderness or fullness.          Left: No mass, tenderness or fullness.        Rectum: No mass, anal fissure, external hemorrhoid or internal hemorrhoid.      Comments: No uterine prolapse cystocele or rectocele.  Normal urethra and Kenhorst's glands.  Musculoskeletal:      Cervical back: Normal range of motion and neck supple.   Lymphadenopathy:      Lower Body: No right inguinal adenopathy. No left inguinal adenopathy.   Skin:     General: Skin is warm and dry.   Neurological:      Mental Status: She is alert and oriented to person, place, and time.   Psychiatric:         Behavior: Behavior  normal.         Thought Content: Thought content normal.         Judgment: Judgment normal.

## 2024-02-07 ENCOUNTER — OFFICE VISIT (OUTPATIENT)
Dept: FAMILY MEDICINE CLINIC | Facility: CLINIC | Age: 64
End: 2024-02-07
Payer: COMMERCIAL

## 2024-02-07 ENCOUNTER — TELEPHONE (OUTPATIENT)
Age: 64
End: 2024-02-07

## 2024-02-07 VITALS
OXYGEN SATURATION: 98 % | RESPIRATION RATE: 18 BRPM | SYSTOLIC BLOOD PRESSURE: 158 MMHG | TEMPERATURE: 97.6 F | HEIGHT: 67 IN | BODY MASS INDEX: 32.49 KG/M2 | WEIGHT: 207 LBS | DIASTOLIC BLOOD PRESSURE: 92 MMHG | HEART RATE: 69 BPM

## 2024-02-07 DIAGNOSIS — I10 PRIMARY HYPERTENSION: Primary | ICD-10-CM

## 2024-02-07 DIAGNOSIS — I49.3 PVC (PREMATURE VENTRICULAR CONTRACTION): ICD-10-CM

## 2024-02-07 DIAGNOSIS — E78.2 MIXED HYPERLIPIDEMIA: ICD-10-CM

## 2024-02-07 LAB
HPV HR 12 DNA CVX QL NAA+PROBE: NEGATIVE
HPV16 DNA CVX QL NAA+PROBE: NEGATIVE
HPV18 DNA CVX QL NAA+PROBE: NEGATIVE

## 2024-02-07 PROCEDURE — 99214 OFFICE O/P EST MOD 30 MIN: CPT | Performed by: FAMILY MEDICINE

## 2024-02-07 RX ORDER — AMLODIPINE BESYLATE 5 MG/1
5 TABLET ORAL DAILY
Qty: 30 TABLET | Refills: 5 | Status: SHIPPED | OUTPATIENT
Start: 2024-02-07

## 2024-02-07 NOTE — PROGRESS NOTES
Name: Bianca Francisco      : 1960      MRN: [id number]  Encounter Provider: Natalie Herr MD  Encounter Date: 2024   Encounter department: [facility]    Assessment & Plan     1. Primary hypertension  Assessment & Plan:  BP Readings from Last 3 Encounters:   24 158/92   24 (!) 155/108   23 122/72     This is a new diagnosis.  Multiple elevated blood pressure readings.  Start patient on amlodipine 5 mg daily.  Obtain lab work to assess kidney function.  Recommend obtaining Omron blood pressure cuff and monitoring pressures at home.  We will follow-up in 2 weeks to reassess blood pressure and adjust medications as needed.    Orders:  -     CBC and differential; Future  -     Comprehensive metabolic panel; Future  -     TSH, 3rd generation with Free T4 reflex; Future  -     amLODIPine (NORVASC) 5 mg tablet; Take 1 tablet (5 mg total) by mouth daily    2. PVC (premature ventricular contraction)  -     Comprehensive metabolic panel; Future  -     TSH, 3rd generation with Free T4 reflex; Future    3. Mixed hyperlipidemia  -     Lipid panel; Future         I have spent a total time of 302 minutes on 24 in caring for this patient including Prognosis, Risks and benefits of tx options, Instructions for management, Patient and family education, Importance of tx compliance, Risk factor reductions, Impressions, Counseling / Coordination of care, Documenting in the medical record, Reviewing / ordering tests, medicine, procedures  , and Obtaining or reviewing history  .   Subjective      Patient presents with:  Hypertension: Shakey feeling, BP today at work was 180/90    Patient currently has a lot of underlying stress her son is currently going through divorce.  She is drinking 3 vodkas per night.  Endorses a poor diet overall.  She is very anxious about these elevated blood pressure readings.    Hypertension  The current episode started more than 1 month ago. The  "problem is unchanged. The problem is uncontrolled. Associated symptoms include headaches. Pertinent negatives include no chest pain, palpitations or shortness of breath.     Review of Systems   Constitutional:  Negative for activity change, fatigue and fever.   Eyes:  Negative for visual disturbance.   Respiratory:  Negative for shortness of breath.    Cardiovascular:  Negative for chest pain and palpitations.   Gastrointestinal:  Negative for abdominal pain, constipation, diarrhea and nausea.   Endocrine: Negative for cold intolerance and heat intolerance.   Musculoskeletal:  Negative for back pain.   Skin:  Negative for rash.   Neurological:  Positive for headaches.   Psychiatric/Behavioral:  Positive for dysphoric mood. Negative for confusion. The patient is nervous/anxious.        Current Outpatient Medications on File Prior to Visit   Medication Sig   • Nutritional Supplements (VITAMIN D MAINTENANCE PO) Take 2,000 Units by mouth daily   • omeprazole (PriLOSEC) 40 MG capsule TAKE 1 CAPSULE (40 MG TOTAL) BY MOUTH DAILY.   • rosuvastatin (CRESTOR) 5 mg tablet Take 1 tablet (5 mg total) by mouth daily       Objective     /92 (BP Location: Left arm, Patient Position: Sitting, Cuff Size: Standard)   Pulse 69   Temp 97.6 °F (36.4 °C) (Temporal)   Resp 18   Ht 5' 7\" (1.702 m)   Wt 93.9 kg (207 lb)   LMP  (LMP Unknown)   SpO2 98%   BMI 32.42 kg/m²     Physical Exam  Vitals and nursing note reviewed.   Constitutional:       Appearance: She is well-developed. She is obese.   HENT:      Head: Normocephalic and atraumatic.   Cardiovascular:      Rate and Rhythm: Normal rate and regular rhythm.   Pulmonary:      Effort: Pulmonary effort is normal.      Breath sounds: Normal breath sounds.   Neurological:      General: No focal deficit present.      Mental Status: She is alert.   Psychiatric:         Mood and Affect: Mood is anxious.         Behavior: Behavior normal.       Natalie Herr MD    "

## 2024-02-07 NOTE — TELEPHONE ENCOUNTER
Patient called and states having elevated blood pressure, feels shaky, when bends over really feels dizzy. Scheduled patient for appointment 2/7/2024 at 5 pm with Dr Herr

## 2024-02-08 ENCOUNTER — APPOINTMENT (OUTPATIENT)
Dept: LAB | Facility: MEDICAL CENTER | Age: 64
End: 2024-02-08
Payer: COMMERCIAL

## 2024-02-08 DIAGNOSIS — I49.3 PVC (PREMATURE VENTRICULAR CONTRACTION): ICD-10-CM

## 2024-02-08 DIAGNOSIS — E78.2 MIXED HYPERLIPIDEMIA: ICD-10-CM

## 2024-02-08 DIAGNOSIS — I10 PRIMARY HYPERTENSION: ICD-10-CM

## 2024-02-08 LAB
ALBUMIN SERPL BCP-MCNC: 4.2 G/DL (ref 3.5–5)
ALP SERPL-CCNC: 69 U/L (ref 34–104)
ALT SERPL W P-5'-P-CCNC: 13 U/L (ref 7–52)
ANION GAP SERPL CALCULATED.3IONS-SCNC: 10 MMOL/L
AST SERPL W P-5'-P-CCNC: 16 U/L (ref 13–39)
BASOPHILS # BLD AUTO: 0.02 THOUSANDS/ÂΜL (ref 0–0.1)
BASOPHILS NFR BLD AUTO: 0 % (ref 0–1)
BILIRUB SERPL-MCNC: 0.66 MG/DL (ref 0.2–1)
BUN SERPL-MCNC: 14 MG/DL (ref 5–25)
CALCIUM SERPL-MCNC: 9.4 MG/DL (ref 8.4–10.2)
CHLORIDE SERPL-SCNC: 102 MMOL/L (ref 96–108)
CHOLEST SERPL-MCNC: 234 MG/DL
CO2 SERPL-SCNC: 26 MMOL/L (ref 21–32)
CREAT SERPL-MCNC: 0.8 MG/DL (ref 0.6–1.3)
EOSINOPHIL # BLD AUTO: 0.07 THOUSAND/ÂΜL (ref 0–0.61)
EOSINOPHIL NFR BLD AUTO: 1 % (ref 0–6)
ERYTHROCYTE [DISTWIDTH] IN BLOOD BY AUTOMATED COUNT: 12.6 % (ref 11.6–15.1)
GFR SERPL CREATININE-BSD FRML MDRD: 78 ML/MIN/1.73SQ M
GLUCOSE P FAST SERPL-MCNC: 102 MG/DL (ref 65–99)
HCT VFR BLD AUTO: 44 % (ref 34.8–46.1)
HDLC SERPL-MCNC: 51 MG/DL
HGB BLD-MCNC: 14.5 G/DL (ref 11.5–15.4)
IMM GRANULOCYTES # BLD AUTO: 0.01 THOUSAND/UL (ref 0–0.2)
IMM GRANULOCYTES NFR BLD AUTO: 0 % (ref 0–2)
LDLC SERPL CALC-MCNC: 145 MG/DL (ref 0–100)
LYMPHOCYTES # BLD AUTO: 2.18 THOUSANDS/ÂΜL (ref 0.6–4.47)
LYMPHOCYTES NFR BLD AUTO: 40 % (ref 14–44)
MCH RBC QN AUTO: 30.8 PG (ref 26.8–34.3)
MCHC RBC AUTO-ENTMCNC: 33 G/DL (ref 31.4–37.4)
MCV RBC AUTO: 93 FL (ref 82–98)
MONOCYTES # BLD AUTO: 0.46 THOUSAND/ÂΜL (ref 0.17–1.22)
MONOCYTES NFR BLD AUTO: 9 % (ref 4–12)
NEUTROPHILS # BLD AUTO: 2.66 THOUSANDS/ÂΜL (ref 1.85–7.62)
NEUTS SEG NFR BLD AUTO: 50 % (ref 43–75)
NONHDLC SERPL-MCNC: 183 MG/DL
NRBC BLD AUTO-RTO: 0 /100 WBCS
PLATELET # BLD AUTO: 199 THOUSANDS/UL (ref 149–390)
PMV BLD AUTO: 10.7 FL (ref 8.9–12.7)
POTASSIUM SERPL-SCNC: 4 MMOL/L (ref 3.5–5.3)
PROT SERPL-MCNC: 7.4 G/DL (ref 6.4–8.4)
RBC # BLD AUTO: 4.71 MILLION/UL (ref 3.81–5.12)
SODIUM SERPL-SCNC: 138 MMOL/L (ref 135–147)
TRIGL SERPL-MCNC: 190 MG/DL
TSH SERPL DL<=0.05 MIU/L-ACNC: 2.32 UIU/ML (ref 0.45–4.5)
WBC # BLD AUTO: 5.4 THOUSAND/UL (ref 4.31–10.16)

## 2024-02-08 PROCEDURE — 84443 ASSAY THYROID STIM HORMONE: CPT

## 2024-02-08 PROCEDURE — 80061 LIPID PANEL: CPT

## 2024-02-08 PROCEDURE — 85025 COMPLETE CBC W/AUTO DIFF WBC: CPT

## 2024-02-08 PROCEDURE — 80053 COMPREHEN METABOLIC PANEL: CPT

## 2024-02-08 PROCEDURE — 36415 COLL VENOUS BLD VENIPUNCTURE: CPT

## 2024-02-08 NOTE — ASSESSMENT & PLAN NOTE
BP Readings from Last 3 Encounters:   02/07/24 158/92   02/05/24 (!) 155/108   12/30/23 122/72     This is a new diagnosis.  Multiple elevated blood pressure readings.  Start patient on amlodipine 5 mg daily.  Obtain lab work to assess kidney function.  Recommend obtaining Omron blood pressure cuff and monitoring pressures at home.  We will follow-up in 2 weeks to reassess blood pressure and adjust medications as needed.

## 2024-02-09 LAB
LAB AP GYN PRIMARY INTERPRETATION: NORMAL
Lab: NORMAL

## 2024-02-13 ENCOUNTER — HOSPITAL ENCOUNTER (EMERGENCY)
Facility: HOSPITAL | Age: 64
Discharge: HOME | End: 2024-02-13
Attending: EMERGENCY MEDICINE
Payer: COMMERCIAL

## 2024-02-13 ENCOUNTER — APPOINTMENT (EMERGENCY)
Dept: RADIOLOGY | Facility: HOSPITAL | Age: 64
End: 2024-02-13
Payer: COMMERCIAL

## 2024-02-13 VITALS
SYSTOLIC BLOOD PRESSURE: 128 MMHG | HEART RATE: 67 BPM | DIASTOLIC BLOOD PRESSURE: 72 MMHG | RESPIRATION RATE: 20 BRPM | TEMPERATURE: 98 F | OXYGEN SATURATION: 98 %

## 2024-02-13 DIAGNOSIS — I10 HYPERTENSION: Primary | ICD-10-CM

## 2024-02-13 LAB
2HR DELTA HS TROPONIN: 0 NG/L
ALBUMIN SERPL BCP-MCNC: 4.2 G/DL (ref 3.5–5)
ALP SERPL-CCNC: 70 U/L (ref 34–104)
ALT SERPL W P-5'-P-CCNC: 16 U/L (ref 7–52)
ANION GAP SERPL CALCULATED.3IONS-SCNC: 10 MMOL/L
AST SERPL W P-5'-P-CCNC: 15 U/L (ref 13–39)
BASOPHILS # BLD AUTO: 0.02 THOUSANDS/ÂΜL (ref 0–0.1)
BASOPHILS NFR BLD AUTO: 0 % (ref 0–1)
BILIRUB SERPL-MCNC: 0.33 MG/DL (ref 0.2–1)
BUN SERPL-MCNC: 17 MG/DL (ref 5–25)
CALCIUM SERPL-MCNC: 9.3 MG/DL (ref 8.4–10.2)
CARDIAC TROPONIN I PNL SERPL HS: 3 NG/L
CARDIAC TROPONIN I PNL SERPL HS: 3 NG/L
CHLORIDE SERPL-SCNC: 102 MMOL/L (ref 96–108)
CO2 SERPL-SCNC: 26 MMOL/L (ref 21–32)
CREAT SERPL-MCNC: 0.95 MG/DL (ref 0.6–1.3)
D DIMER PPP FEU-MCNC: 0.44 UG/ML FEU
EOSINOPHIL # BLD AUTO: 0.08 THOUSAND/ÂΜL (ref 0–0.61)
EOSINOPHIL NFR BLD AUTO: 1 % (ref 0–6)
ERYTHROCYTE [DISTWIDTH] IN BLOOD BY AUTOMATED COUNT: 12.5 % (ref 11.6–15.1)
GFR SERPL CREATININE-BSD FRML MDRD: 63 ML/MIN/1.73SQ M
GLUCOSE SERPL-MCNC: 118 MG/DL (ref 65–140)
GLUCOSE SERPL-MCNC: 119 MG/DL (ref 65–140)
HCT VFR BLD AUTO: 41.4 % (ref 34.8–46.1)
HGB BLD-MCNC: 14.1 G/DL (ref 11.5–15.4)
IMM GRANULOCYTES # BLD AUTO: 0.02 THOUSAND/UL (ref 0–0.2)
IMM GRANULOCYTES NFR BLD AUTO: 0 % (ref 0–2)
LYMPHOCYTES # BLD AUTO: 2.28 THOUSANDS/ÂΜL (ref 0.6–4.47)
LYMPHOCYTES NFR BLD AUTO: 28 % (ref 14–44)
MAGNESIUM SERPL-MCNC: 2 MG/DL (ref 1.9–2.7)
MCH RBC QN AUTO: 31.3 PG (ref 26.8–34.3)
MCHC RBC AUTO-ENTMCNC: 34.1 G/DL (ref 31.4–37.4)
MCV RBC AUTO: 92 FL (ref 82–98)
MONOCYTES # BLD AUTO: 0.58 THOUSAND/ÂΜL (ref 0.17–1.22)
MONOCYTES NFR BLD AUTO: 7 % (ref 4–12)
NEUTROPHILS # BLD AUTO: 5.23 THOUSANDS/ÂΜL (ref 1.85–7.62)
NEUTS SEG NFR BLD AUTO: 64 % (ref 43–75)
NRBC BLD AUTO-RTO: 0 /100 WBCS
PLATELET # BLD AUTO: 182 THOUSANDS/UL (ref 149–390)
PMV BLD AUTO: 10.3 FL (ref 8.9–12.7)
POTASSIUM SERPL-SCNC: 3.5 MMOL/L (ref 3.5–5.3)
PROT SERPL-MCNC: 7.6 G/DL (ref 6.4–8.4)
RBC # BLD AUTO: 4.5 MILLION/UL (ref 3.81–5.12)
SODIUM SERPL-SCNC: 138 MMOL/L (ref 135–147)
WBC # BLD AUTO: 8.21 THOUSAND/UL (ref 4.31–10.16)

## 2024-02-13 PROCEDURE — 99285 EMERGENCY DEPT VISIT HI MDM: CPT | Performed by: STUDENT IN AN ORGANIZED HEALTH CARE EDUCATION/TRAINING PROGRAM

## 2024-02-13 PROCEDURE — 84484 ASSAY OF TROPONIN QUANT: CPT

## 2024-02-13 PROCEDURE — 83735 ASSAY OF MAGNESIUM: CPT

## 2024-02-13 PROCEDURE — 80053 COMPREHEN METABOLIC PANEL: CPT

## 2024-02-13 PROCEDURE — 85025 COMPLETE CBC W/AUTO DIFF WBC: CPT

## 2024-02-13 PROCEDURE — 93005 ELECTROCARDIOGRAM TRACING: CPT

## 2024-02-13 PROCEDURE — 36415 COLL VENOUS BLD VENIPUNCTURE: CPT

## 2024-02-13 PROCEDURE — 85379 FIBRIN DEGRADATION QUANT: CPT

## 2024-02-13 PROCEDURE — 99284 EMERGENCY DEPT VISIT MOD MDM: CPT

## 2024-02-13 PROCEDURE — 71045 X-RAY EXAM CHEST 1 VIEW: CPT

## 2024-02-13 PROCEDURE — 82948 REAGENT STRIP/BLOOD GLUCOSE: CPT

## 2024-02-13 NOTE — DISCHARGE INSTRUCTIONS
Please continue taking your amlodipine and monitoring your blood pressure as your PCP requested you do. Please reach out to your PCP and let them know that you were seen here today.  Return to the emergency department if you have persistent chest pain, if you become lightheaded or lose consciousness, if you have shortness of breath or if you have nausea and vomiting.

## 2024-02-13 NOTE — ED PROVIDER NOTES
"History  Chief Complaint   Patient presents with    High Blood Pressure     Was put on HBP meds last week, was monitoring and today was high in the 170s. Has felt \"jittery\" and nauseous since 2100 tonight. Denies headache. Denies pain. Reports increased thirst     Bianca Francisco is a 63 y.o. female with a PMH of GERD, high cholesterol, palpitations and newly diagnosed hypertension presenting to the ED on 2024 with high blood pressure. Patient endorses that she was feeling well all day yesterday and had dinner of thapa and a sandwich, played a board game with her  and then took an Epsom salt bath. After this she began feeling very jittery and nauseas. She went to bed and tried to fall asleep, she believes she might have slept for a little bit but woke up still feeling jittery. She is endorsing some shortness of breath, but states that is may be anxiety related. Patient was recently started on amlodipine 5mg by her pcp and was told to monitor her BP at home. She denies a history of cancer, DVT, PE, recent travel, recent surgery, chest pain, leg swelling, leg pain, abdominal pain, headache, dizziness, blurry vision, vomiting or any other complaints at this time.             Prior to Admission Medications   Prescriptions Last Dose Informant Patient Reported? Taking?   Nutritional Supplements (VITAMIN D MAINTENANCE PO) 2024 Self Yes Yes   Sig: Take 2,000 Units by mouth daily   amLODIPine (NORVASC) 5 mg tablet 2024  No Yes   Sig: Take 1 tablet (5 mg total) by mouth daily   omeprazole (PriLOSEC) 40 MG capsule 2024 Self No Yes   Sig: TAKE 1 CAPSULE (40 MG TOTAL) BY MOUTH DAILY.   rosuvastatin (CRESTOR) 5 mg tablet 2024 Self No Yes   Sig: Take 1 tablet (5 mg total) by mouth daily      Facility-Administered Medications: None       Past Medical History:   Diagnosis Date    [facility]     Family hx of colon cancer requiring screening colonoscopy     sister rectal ca,brother  colon " cancer,sister breast cancer    GERD (gastroesophageal reflux disease)     High cholesterol     History of colon polyps     Hypertension     Osteoarthritis     Palpitations     Papanicolaou smear 2017    NEG       Past Surgical History:   Procedure Laterality Date     SECTION      x2    COLONOSCOPY N/A 2016    Procedure: COLONOSCOPY;  Surgeon: Sandeep Mclean MD;  Location: [facility];  Service:     COLONOSCOPY W/ POLYPECTOMY      MAMMO (HISTORICAL) Bilateral 2019    No evidence of malignancy    OVARIAN CYST REMOVAL      IL COLONOSCOPY FLX DX W/COLLJ SPEC WHEN PFRMD N/A 2017    Procedure: COLONOSCOPY;  Surgeon: Sandeep Mclean MD;  Location: [facility];  Service: Colorectal    IL ESOPHAGOGASTRODUODENOSCOPY TRANSORAL DIAGNOSTIC N/A 2017    Procedure: ESOPHAGOGASTRODUODENOSCOPY (EGD);  Surgeon: Jaiden Britt MD;  Location: [facility];  Service: Gastroenterology    IL EXCISION GANGLION WRIST DORSAL/VOLAR PRIMARY Right 2018    Procedure: WRIST GANGLION CYST EXCISION;  Surgeon: Monico Salgado DO;  [facility];  Service: Orthopedics    IL NEUROPLASTY &/TRANSPOS MEDIAN NRV CARPAL TUNNE Right 2018    Procedure: CARPAL TUNNEL RELEASE;  Surgeon: Monico Salgado DO;  [facility];  Service: Orthopedics    RECTAL POLYPECTOMY      last assessed:  14    TONSILLECTOMY         Family History   Problem Relation Age of Onset    Stroke Mother         stroke syndrome    Stroke Father         stroke syndrome    Diabetes type II Father     Hypertension Father     Diabetes Father     Breast cancer Sister 40    Rectal cancer Sister     Colon cancer Brother     Colon cancer Sister     Prostate cancer Paternal Grandfather      I have reviewed and agree with the history as documented.    E-Cigarette/Vaping     E-Cigarette/Vaping Substances     Social History     Tobacco Use    Smoking status: Never    Smokeless tobacco: Never   Substance Use Topics    Alcohol use: Yes      Alcohol/week: 7.0 standard drinks of alcohol     Types: 7 Standard drinks or equivalent per week     Comment: 2 drinks/night    Drug use: No       Review of Systems   Constitutional:  Negative for chills and fever.   HENT:  Negative for ear pain and sore throat.    Eyes:  Negative for pain and visual disturbance.   Respiratory:  Positive for shortness of breath. Negative for cough.    Cardiovascular:  Positive for palpitations. Negative for chest pain and leg swelling.   Gastrointestinal:  Positive for nausea. Negative for abdominal pain and vomiting.   Genitourinary:  Negative for dysuria and hematuria.   Musculoskeletal:  Negative for arthralgias and back pain.   Skin:  Negative for color change and rash.   Neurological:  Negative for dizziness, seizures, syncope, light-headedness, numbness and headaches.   All other systems reviewed and are negative.      Physical Exam  Physical Exam  Vitals and nursing note reviewed.   Constitutional:       General: She is not in acute distress.     Appearance: Normal appearance. She is normal weight. She is not ill-appearing, toxic-appearing or diaphoretic.   HENT:      Head: Normocephalic and atraumatic.      Right Ear: External ear normal.      Left Ear: External ear normal.      Nose: Nose normal. No congestion or rhinorrhea.      Mouth/Throat:      Mouth: Mucous membranes are moist.   Eyes:      General: No scleral icterus.        Right eye: No discharge.         Left eye: No discharge.      Extraocular Movements: Extraocular movements intact.      Conjunctiva/sclera: Conjunctivae normal.   Cardiovascular:      Rate and Rhythm: Normal rate and regular rhythm.      Heart sounds: Normal heart sounds. No murmur heard.     No friction rub. No gallop.   Pulmonary:      Effort: Pulmonary effort is normal. No respiratory distress.      Breath sounds: Normal breath sounds. No wheezing, rhonchi or rales.   Abdominal:      General: Abdomen is flat. Bowel sounds are normal. There is  no distension.      Palpations: Abdomen is soft.      Tenderness: There is no abdominal tenderness. There is no guarding or rebound.   Musculoskeletal:         General: No signs of injury. Normal range of motion.      Cervical back: Normal range of motion and neck supple. No rigidity.   Skin:     General: Skin is warm.      Capillary Refill: Capillary refill takes less than 2 seconds.      Coloration: Skin is not pale.      Findings: No erythema.   Neurological:      General: No focal deficit present.      Mental Status: She is alert and oriented to person, place, and time. Mental status is at baseline.      Motor: No weakness.      Gait: Gait normal.   Psychiatric:         Mood and Affect: Mood normal.         Behavior: Behavior normal.         Vital Signs  ED Triage Vitals   Temperature Pulse Respirations Blood Pressure SpO2   02/13/24 0150 02/13/24 0140 02/13/24 0140 02/13/24 0140 02/13/24 0140   98 °F (36.7 °C) 91 20 (!) 194/90 98 %      Temp src Heart Rate Source Patient Position - Orthostatic VS BP Location FiO2 (%)   -- 02/13/24 0140 02/13/24 0140 02/13/24 0140 --    Monitor Sitting Right arm       Pain Score       02/13/24 0140       No Pain           Vitals:    02/13/24 0330 02/13/24 0400 02/13/24 0430 02/13/24 0500   BP: 132/73 131/69 128/78 128/72   Pulse: 67      Patient Position - Orthostatic VS:             Visual Acuity      ED Medications  Medications - No data to display    Diagnostic Studies  Results Reviewed       Procedure Component Value Units Date/Time    HS Troponin I 2hr [[id number]]  (Normal) Collected: 02/13/24 0437    Lab Status: Final result Specimen: Blood from Arm, Right Updated: 02/13/24 0513     hs TnI 2hr 3 ng/L      Delta 2hr hsTnI 0 ng/L     HS Troponin I 4hr [[id number]]     Lab Status: No result Specimen: Blood     HS Troponin 0hr (reflex protocol) [[id number]]  (Normal) Collected: 02/13/24 0210    Lab Status: Final result Specimen: Blood from Arm, Right Updated: 02/13/24 0240      hs TnI 0hr 3 ng/L     Comprehensive metabolic panel [[id number]] Collected: 02/13/24 0210    Lab Status: Final result Specimen: Blood from Arm, Right Updated: 02/13/24 0232     Sodium 138 mmol/L      Potassium 3.5 mmol/L      Chloride 102 mmol/L      CO2 26 mmol/L      ANION GAP 10 mmol/L      BUN 17 mg/dL      Creatinine 0.95 mg/dL      Glucose 118 mg/dL      Calcium 9.3 mg/dL      AST 15 U/L      ALT 16 U/L      Alkaline Phosphatase 70 U/L      Total Protein 7.6 g/dL      Albumin 4.2 g/dL      Total Bilirubin 0.33 mg/dL      eGFR 63 ml/min/1.73sq m     Narrative:      National Kidney Disease Foundation guidelines for Chronic Kidney Disease (CKD):     Stage 1 with normal or high GFR (GFR > 90 mL/min/1.73 square meters)    Stage 2 Mild CKD (GFR = 60-89 mL/min/1.73 square meters)    Stage 3A Moderate CKD (GFR = 45-59 mL/min/1.73 square meters)    Stage 3B Moderate CKD (GFR = 30-44 mL/min/1.73 square meters)    Stage 4 Severe CKD (GFR = 15-29 mL/min/1.73 square meters)    Stage 5 End Stage CKD (GFR <15 mL/min/1.73 square meters)  Note: GFR calculation is accurate only with a steady state creatinine    Magnesium [[id number]]  (Normal) Collected: 02/13/24 0210    Lab Status: Final result Specimen: Blood from Arm, Right Updated: 02/13/24 0232     Magnesium 2.0 mg/dL     D-dimer, quantitative [[id number]]  (Normal) Collected: 02/13/24 0210    Lab Status: Final result Specimen: Blood from Arm, Right Updated: 02/13/24 0231     D-Dimer, Quant 0.44 ug/ml FEU     Narrative:      In the evaluation for possible pulmonary embolism, in the appropriate (Well's Score of 4 or less) patient, the age adjusted d-dimer cutoff for this patient can be calculated as:    Age x 0.01 (in ug/mL) for Age-adjusted D-dimer exclusion threshold for a patient over 50 years.    CBC and differential [[id number]] Collected: 02/13/24 0210    Lab Status: Final result Specimen: Blood from Arm, Right Updated: 02/13/24 0217     WBC 8.21 Thousand/uL      RBC 4.50  Million/uL      Hemoglobin 14.1 g/dL      Hematocrit 41.4 %      MCV 92 fL      MCH 31.3 pg      MCHC 34.1 g/dL      RDW 12.5 %      MPV 10.3 fL      Platelets 182 Thousands/uL      nRBC 0 /100 WBCs      Neutrophils Relative 64 %      Immat GRANS % 0 %      Lymphocytes Relative 28 %      Monocytes Relative 7 %      Eosinophils Relative 1 %      Basophils Relative 0 %      Neutrophils Absolute 5.23 Thousands/µL      Immature Grans Absolute 0.02 Thousand/uL      Lymphocytes Absolute 2.28 Thousands/µL      Monocytes Absolute 0.58 Thousand/µL      Eosinophils Absolute 0.08 Thousand/µL      Basophils Absolute 0.02 Thousands/µL     Fingerstick Glucose (POCT) [[id number]]  (Normal) Collected: 02/13/24 0148    Lab Status: Final result Updated: 02/13/24 0152     POC Glucose 119 mg/dl                    XR chest 1 view portable   ED Interpretation by Melody Hines PA-C (02/13 0241)   No acute cardiopulmonary abnormalities per my read                 Procedures  ECG 12 Lead Documentation Only    Date/Time: 2/13/2024 1:48 AM    Performed by: Melody Hines PA-C  Authorized by: Melody Hines PA-C    Indications / Diagnosis:  Palpitations  ECG reviewed by me, the ED Provider: yes    Patient location:  ED  Previous ECG:     Previous ECG:  Compared to current    Comparison ECG info:  11/1/18    Similarity:  Changes noted (Sinus bradycardia to normal sinus)    Comparison to cardiac monitor: Yes    Interpretation:     Interpretation: normal    Rate:     ECG rate:  89    ECG rate assessment: normal    Rhythm:     Rhythm: sinus rhythm    Ectopy:     Ectopy: none    QRS:     QRS axis:  Normal    QRS intervals:  Normal  Conduction:     Conduction: normal    ST segments:     ST segments:  Normal  T waves:     T waves: normal             ED Course  ED Course as of 02/13/24 0535   Tue Feb 13, 2024   0224 Blood Pressure: 146/74  Bp lowered without intervention   0240 Magnesium  normal   0240 D-dimer, quantitative  wnl   0240  Fingerstick Glucose (POCT)  normal   0241 HS Troponin 0hr (reflex protocol)  Normal, will get 2 hour as pts symptoms started just prior to presentation   0519 Heart score of 2, will dc w fu to pcp             HEART Risk Score      Flowsheet Row Most Recent Value   Heart Score Risk Calculator    History 0 Filed at: 02/13/2024 0514   ECG 0 Filed at: 02/13/2024 0514   Age 1 Filed at: 02/13/2024 0514   Risk Factors 1 Filed at: 02/13/2024 0514   Troponin 0 Filed at: 02/13/2024 0514   HEART Score 2 Filed at: 02/13/2024 0514                              Wells' Criteria for PE      Flowsheet Row Most Recent Value   Wells' Criteria for PE    Clinical signs and symptoms of DVT 0 Filed at: 02/13/2024 0208   PE is primary diagnosis or equally likely 0 Filed at: 02/13/2024 0208   HR >100 0 Filed at: 02/13/2024 0208   Immobilization at least 3 days or Surgery in the previous 4 weeks 0 Filed at: 02/13/2024 0208   Previous, objectively diagnosed PE or DVT 0 Filed at: 02/13/2024 0208   Hemoptysis 0 Filed at: 02/13/2024 0208   Malignancy with treatment within 6 months or palliative --   Wells' Criteria Total 0 Filed at: 02/13/2024 0208                  Medical Decision Making  Patient seen and examined noted to have hypertension.  Patient was just started on amlodipine and told to monitor her blood pressure by her primary care provider.  She was doing this at home and it was mildly elevated throughout the day which has been normal for her however around bedtime she started to feel jittery and nauseous so she had her  retake her blood pressure and it was very elevated.  Because of this patient came to be evaluated today.  She does states she has chronic palpitations and has been evaluated for them before however this feels different.  Patient's blood pressure lowered here without any intervention.  Entire workup including 0-hour troponin, 2-hour Trope, mag, CMP, D-dimer and CBC were all within normal limits.  Chest x-ray  "revealed no acute cardiopulmonary abnormality.  Patient will call her primary care provider in the morning to let them know she was seen here and she does already have an appointment with them on Wednesday.  Gave strict return precautions which she expressed her understanding of.    Differential diagnosis includes but is not limited to hypertension, hypertensive urgency, hypertensive crisis, malignant hypertension, end organ damage, renal failure, metabolic abnormality, intracranial process, ACS, MI; doubt pheochromocytoma.     EKG: Was interpreted by myself as above.     Patient appears well, is nontoxic appearing, she expresses understanding and agrees with plan of care at this time.  In light of this patient would benefit from outpatient management.    Patient at time of discharge well-appearing in no acute distress, all questions answered. Patient agreeable to plan.  Patient's vitals, lab/imaging results, diagnosis, and treatment plan were discussed with the patient. All new/changed medications were discussed with patient, specifically, route of administration, how often and when to take, and where they can be picked up. Strict return precautions as well as close follow up with PCP was discussed with the patient and the patient was agreeable to my recommendations. Patient verbally acknowledged understanding of the above communications. Strict return precautions were provided. All labs reviewed and utilized in the medical decision making process (if labs were ordered). Portions of the record may have been created with voice recognition software.  Occasional wrong word or \"sound a like\" substitutions may have occurred due to the inherent limitations of voice recognition software.  Read the chart carefully and recognize, using context, where substitutions have occurred.      Amount and/or Complexity of Data Reviewed  Labs: ordered. Decision-making details documented in ED Course.  Radiology: ordered and independent " interpretation performed.             Disposition  Final diagnoses:   Hypertension     Time reflects when diagnosis was documented in both MDM as applicable and the Disposition within this note       Time User Action Codes Description Comment    2/13/2024  5:14 AM Melody Hines Add [I10] Hypertension           ED Disposition       ED Disposition   Discharge    Condition   Stable    Date/Time   Tue Feb 13, 2024 0514    Comment   Bianca Francisco discharge to home/self care.                   Follow-up Information       Follow up With Specialties Details Why Contact Info Additional Information    Natalie Herr MD Family Medicine In 2 days  [address]  [facility] [address]  [phone]       [facility] Emergency Medicine  If symptoms worsen, As needed [facility] [facility] [address]  [phone] [facility], [facility] [place], [place], [address]            Discharge Medication List as of 2/13/2024  5:19 AM        CONTINUE these medications which have NOT CHANGED    Details   amLODIPine (NORVASC) 5 mg tablet Take 1 tablet (5 mg total) by mouth daily, Starting Wed 2/7/2024, Normal      Nutritional Supplements (VITAMIN D MAINTENANCE PO) Take 2,000 Units by mouth daily, Historical Med      omeprazole (PriLOSEC) 40 MG capsule TAKE 1 CAPSULE (40 MG TOTAL) BY MOUTH DAILY., Starting Wed 11/1/2023, Normal      rosuvastatin (CRESTOR) 5 mg tablet Take 1 tablet (5 mg total) by mouth daily, Starting Mon 7/17/2023, Normal             No discharge procedures on file.    PDMP Review       None            ED Provider  Electronically Signed by             Melody Hines PA-C  02/13/24 0535

## 2024-02-14 LAB
ATRIAL RATE: 89 BPM
P AXIS: 46 DEGREES
PR INTERVAL: 154 MS
QRS AXIS: 3 DEGREES
QRSD INTERVAL: 92 MS
QT INTERVAL: 384 MS
QTC INTERVAL: 467 MS
T WAVE AXIS: 38 DEGREES
VENTRICULAR RATE: 89 BPM

## 2024-02-15 ENCOUNTER — OFFICE VISIT (OUTPATIENT)
Dept: FAMILY MEDICINE CLINIC | Facility: CLINIC | Age: 64
End: 2024-02-15
Payer: COMMERCIAL

## 2024-02-15 VITALS
OXYGEN SATURATION: 96 % | DIASTOLIC BLOOD PRESSURE: 76 MMHG | WEIGHT: 201 LBS | HEART RATE: 79 BPM | BODY MASS INDEX: 31.55 KG/M2 | HEIGHT: 67 IN | SYSTOLIC BLOOD PRESSURE: 126 MMHG | RESPIRATION RATE: 16 BRPM | TEMPERATURE: 97.9 F

## 2024-02-15 DIAGNOSIS — Z78.9 ALCOHOL USE: ICD-10-CM

## 2024-02-15 DIAGNOSIS — I49.3 PVC (PREMATURE VENTRICULAR CONTRACTION): ICD-10-CM

## 2024-02-15 DIAGNOSIS — I10 PRIMARY HYPERTENSION: Primary | ICD-10-CM

## 2024-02-15 DIAGNOSIS — F41.0 PANIC ATTACK: ICD-10-CM

## 2024-02-15 PROBLEM — F10.90 ALCOHOL USE: Status: ACTIVE | Noted: 2024-02-15

## 2024-02-15 PROCEDURE — 99214 OFFICE O/P EST MOD 30 MIN: CPT | Performed by: FAMILY MEDICINE

## 2024-02-15 RX ORDER — HYDROXYZINE HYDROCHLORIDE 25 MG/1
25 TABLET, FILM COATED ORAL EVERY 6 HOURS PRN
Qty: 20 TABLET | Refills: 0 | Status: SHIPPED | OUTPATIENT
Start: 2024-02-15

## 2024-02-15 NOTE — ASSESSMENT & PLAN NOTE
PVCs noted on previous Holter monitor.  Likely component of panic attack and decreasing alcohol use.  Continue to monitor.  Can consider Holter monitor if symptoms fail to improve or worsen.

## 2024-02-15 NOTE — PROGRESS NOTES
Name: Bianca Francisco      : 1960      MRN: [id number]  Encounter Provider: Natalie Herr MD  Encounter Date: 2/15/2024   Encounter department: [facility]    Assessment & Plan     1. Primary hypertension  Assessment & Plan:  BP Readings from Last 3 Encounters:   02/15/24 126/76   24 128/72   24 158/92     Tolerating amlodipine well.  Continue 5 mg daily.  Monitor BP at home. Continue decreasing alcohol intake.       2. PVC (premature ventricular contraction)  Assessment & Plan:  PVCs noted on previous Holter monitor.  Likely component of panic attack and decreasing alcohol use.  Continue to monitor.  Can consider Holter monitor if symptoms fail to improve or worsen.      3. Panic attack  -     hydrOXYzine HCL (ATARAX) 25 mg tablet; Take 1 tablet (25 mg total) by mouth every 6 (six) hours as needed for itching  -     Ambulatory referral to Psych Services; Future    4. Alcohol use  Assessment & Plan:  Working on cutting back intake. Previously drinking 3 strong drinks per night.  I suspect feelings of jitteriness or secondary to cutting back alcohol intake.  She had 4 days without a drink.  She previously was drinking 21+ drinks per week.  Advised her not to go cold turkey.  She can have 1 drink per night and slowly taper from there. Not currently in alcohol withdrawal.          Depression Screening and Follow-up Plan: Patient was screened for depression during today's encounter. They screened negative with a PHQ-2 score of 0.        Subjective      Patient presents with:  Hypertension: Disscuse BP and labs     Monday feeling jittery. /92. Went to the hospital. BP elevated but came down without intervention.     Feels like she's having a panic attack.  Feeling palpitations     Previously drinking 3 strong drinks per night.  She had 2 glasses of wine during the Super Bowl and 1 drink on Monday night.  Woke up this morning with jitters feeling well  "currently.    Hypertension  Associated symptoms include palpitations. Pertinent negatives include no chest pain, headaches or shortness of breath.     Review of Systems   Constitutional:  Negative for activity change, fatigue and fever.   Eyes:  Negative for visual disturbance.   Respiratory:  Negative for shortness of breath.    Cardiovascular:  Positive for palpitations. Negative for chest pain.   Gastrointestinal:  Negative for abdominal pain, constipation, diarrhea and nausea.   Endocrine: Negative for cold intolerance and heat intolerance.   Musculoskeletal:  Negative for back pain.   Skin:  Negative for rash.   Neurological:  Negative for headaches.   Psychiatric/Behavioral:  Negative for confusion. The patient is nervous/anxious.        Current Outpatient Medications on File Prior to Visit   Medication Sig   • amLODIPine (NORVASC) 5 mg tablet Take 1 tablet (5 mg total) by mouth daily   • Nutritional Supplements (VITAMIN D MAINTENANCE PO) Take 2,000 Units by mouth daily   • omeprazole (PriLOSEC) 40 MG capsule TAKE 1 CAPSULE (40 MG TOTAL) BY MOUTH DAILY.   • rosuvastatin (CRESTOR) 5 mg tablet Take 1 tablet (5 mg total) by mouth daily       Objective     /76 (BP Location: Left arm, Patient Position: Sitting, Cuff Size: Large)   Pulse 79   Temp 97.9 °F (36.6 °C)   Resp 16   Ht 5' 7\" (1.702 m)   Wt 91.2 kg (201 lb)   LMP  (LMP Unknown)   SpO2 96%   BMI 31.48 kg/m²     Physical Exam  Vitals and nursing note reviewed.   Constitutional:       Appearance: She is well-developed.   HENT:      Head: Normocephalic and atraumatic.   Cardiovascular:      Rate and Rhythm: Normal rate and regular rhythm.   Pulmonary:      Effort: Pulmonary effort is normal.      Breath sounds: Normal breath sounds.   Neurological:      General: No focal deficit present.      Mental Status: She is alert.   Psychiatric:         Mood and Affect: Mood is anxious.         Behavior: Behavior normal.       Natalie Herr, " MD

## 2024-02-15 NOTE — ASSESSMENT & PLAN NOTE
BP Readings from Last 3 Encounters:   02/15/24 126/76   02/13/24 128/72   02/07/24 158/92     Tolerating amlodipine well.  Continue 5 mg daily.  Monitor BP at home. Continue decreasing alcohol intake.

## 2024-02-20 ENCOUNTER — TELEPHONE (OUTPATIENT)
Dept: PSYCHIATRY | Facility: CLINIC | Age: 64
End: 2024-02-20

## 2024-02-20 NOTE — TELEPHONE ENCOUNTER
Spoke with patient in regard to referral received, advised patient of wait list and she would like to stay on the wait list until we are able to schedule

## 2024-02-29 DIAGNOSIS — I10 PRIMARY HYPERTENSION: ICD-10-CM

## 2024-02-29 RX ORDER — AMLODIPINE BESYLATE 5 MG/1
5 TABLET ORAL DAILY
Qty: 90 TABLET | Refills: 1 | Status: SHIPPED | OUTPATIENT
Start: 2024-02-29

## 2024-03-25 ENCOUNTER — HOSPITAL ENCOUNTER (OUTPATIENT)
Dept: RADIOLOGY | Facility: MEDICAL CENTER | Age: 64
Discharge: HOME | End: 2024-03-25
Payer: COMMERCIAL

## 2024-03-25 VITALS — WEIGHT: 201 LBS | HEIGHT: 67 IN | BODY MASS INDEX: 31.55 KG/M2

## 2024-03-25 DIAGNOSIS — Z12.31 ENCOUNTER FOR SCREENING MAMMOGRAM FOR MALIGNANT NEOPLASM OF BREAST: ICD-10-CM

## 2024-03-25 PROCEDURE — 77067 SCR MAMMO BI INCL CAD: CPT

## 2024-03-25 PROCEDURE — 77063 BREAST TOMOSYNTHESIS BI: CPT

## 2024-05-25 DIAGNOSIS — K21.9 GASTROESOPHAGEAL REFLUX DISEASE: ICD-10-CM

## 2024-05-25 RX ORDER — OMEPRAZOLE 40 MG/1
40 CAPSULE, DELAYED RELEASE ORAL DAILY
Qty: 90 CAPSULE | Refills: 1 | Status: SHIPPED | OUTPATIENT
Start: 2024-05-25

## 2024-07-08 ENCOUNTER — RA CDI HCC (OUTPATIENT)
Dept: OTHER | Facility: HOSPITAL | Age: 64
End: 2024-07-08

## 2024-07-26 DIAGNOSIS — E78.01 FAMILIAL HYPERCHOLESTEROLEMIA: ICD-10-CM

## 2024-07-26 RX ORDER — ROSUVASTATIN CALCIUM 5 MG/1
5 TABLET, COATED ORAL DAILY
Qty: 90 TABLET | Refills: 1 | Status: SHIPPED | OUTPATIENT
Start: 2024-07-26

## 2024-09-07 DIAGNOSIS — I10 PRIMARY HYPERTENSION: ICD-10-CM

## 2024-09-07 RX ORDER — AMLODIPINE BESYLATE 5 MG/1
5 TABLET ORAL DAILY
Qty: 90 TABLET | Refills: 1 | Status: SHIPPED | OUTPATIENT
Start: 2024-09-07

## 2024-12-09 DIAGNOSIS — I10 PRIMARY HYPERTENSION: ICD-10-CM

## 2024-12-10 RX ORDER — AMLODIPINE BESYLATE 5 MG/1
5 TABLET ORAL DAILY
Qty: 90 TABLET | Refills: 0 | Status: SHIPPED | OUTPATIENT
Start: 2024-12-10

## 2025-01-14 ENCOUNTER — OFFICE VISIT (OUTPATIENT)
Dept: FAMILY MEDICINE CLINIC | Facility: CLINIC | Age: 65
End: 2025-01-14
Payer: COMMERCIAL

## 2025-01-14 VITALS
RESPIRATION RATE: 18 BRPM | DIASTOLIC BLOOD PRESSURE: 74 MMHG | BODY MASS INDEX: 32.33 KG/M2 | OXYGEN SATURATION: 99 % | TEMPERATURE: 98 F | WEIGHT: 206 LBS | SYSTOLIC BLOOD PRESSURE: 128 MMHG | HEIGHT: 67 IN | HEART RATE: 81 BPM

## 2025-01-14 DIAGNOSIS — E55.9 VITAMIN D DEFICIENCY: ICD-10-CM

## 2025-01-14 DIAGNOSIS — I10 PRIMARY HYPERTENSION: ICD-10-CM

## 2025-01-14 DIAGNOSIS — E78.2 MIXED HYPERLIPIDEMIA: ICD-10-CM

## 2025-01-14 DIAGNOSIS — J06.9 ACUTE URI: Primary | ICD-10-CM

## 2025-01-14 PROCEDURE — 99213 OFFICE O/P EST LOW 20 MIN: CPT

## 2025-01-14 RX ORDER — DOXYCYCLINE HYCLATE 100 MG
100 TABLET ORAL 2 TIMES DAILY
Qty: 14 TABLET | Refills: 0 | Status: SHIPPED | OUTPATIENT
Start: 2025-01-14 | End: 2025-01-21

## 2025-01-14 NOTE — PROGRESS NOTES
Name: Bianca Francisco      : 1960      MRN: [id number]  Encounter Provider: Shannan Lima DO  Encounter Date: 2025   Encounter department: [facility]  :  Assessment & Plan  Acute URI  Allergic to penicillins, complete course of Doxy as below.  Continue supportive care with warm fluids, honey, salt water gargles, saline rinses, etc.  ED/return precautions reviewed  Orders:    doxycycline hyclate (VIBRA-TABS) 100 mg tablet; Take 1 tablet (100 mg total) by mouth 2 (two) times a day for 7 days    Vitamin D deficiency    Orders:    Cholecalciferol (VITAMIN D3) 1,000 units tablet; Take 1 tablet (1,000 Units total) by mouth daily    Vitamin D 25 hydroxy; Future    Mixed hyperlipidemia    Orders:    Lipid panel; Future    Primary hypertension    Orders:    Basic metabolic panel; Future      Patient to follow-up for annual physical with labs, ordered today.     History of Present Illness     HPI  Was recently traveling on a plane.  Started feeling unwell there with congestion, nonproductive cough, ears clicking, sinus pressure and headaches.  Has tried Tylenol, Mucinex, Sudafed, Claritin with minimal relief.  Getting poor sleep.    Review of Systems   Constitutional:  Negative for chills and fever.   HENT:  Positive for congestion, postnasal drip, rhinorrhea and sinus pressure. Negative for sore throat and trouble swallowing.    Eyes:  Negative for visual disturbance.   Respiratory:  Negative for cough and shortness of breath.    Cardiovascular:  Negative for chest pain and palpitations.   Gastrointestinal:  Negative for abdominal pain, constipation, diarrhea, nausea and vomiting.   Genitourinary:  Negative for dysuria.   Musculoskeletal:  Negative for arthralgias.   Skin:  Negative for rash.   Neurological:  Negative for dizziness, light-headedness and headaches.       Objective   /74 (BP Location: Left arm, Patient Position: Sitting, Cuff Size: Large)   Pulse 81   Temp 98 °F (36.7  "°C) (Temporal)   Resp 18   Ht 5' 7\" (1.702 m)   Wt 93.4 kg (206 lb)   LMP  (LMP Unknown)   SpO2 99%   BMI 32.26 kg/m²      Physical Exam  Vitals reviewed.   Constitutional:       Appearance: She is ill-appearing.   HENT:      Head: Normocephalic and atraumatic.      Right Ear: Tympanic membrane, ear canal and external ear normal.      Left Ear: Tympanic membrane, ear canal and external ear normal.      Nose: Congestion present.      Mouth/Throat:      Pharynx: Oropharynx is clear. No oropharyngeal exudate or posterior oropharyngeal erythema.   Eyes:      General:         Right eye: No discharge.         Left eye: No discharge.      Extraocular Movements: Extraocular movements intact.      Conjunctiva/sclera: Conjunctivae normal.   Cardiovascular:      Rate and Rhythm: Normal rate and regular rhythm.      Pulses: Normal pulses.      Heart sounds: Normal heart sounds.   Pulmonary:      Effort: Pulmonary effort is normal.      Breath sounds: Normal breath sounds.   Abdominal:      Palpations: Abdomen is soft.   Musculoskeletal:      Cervical back: Neck supple.      Right lower leg: No edema.      Left lower leg: No edema.   Skin:     General: Skin is warm.   Neurological:      Mental Status: She is alert and oriented to person, place, and time.   Psychiatric:         Mood and Affect: Mood normal.         Behavior: Behavior normal.         Thought Content: Thought content normal.         Judgment: Judgment normal.         "

## 2025-01-14 NOTE — ASSESSMENT & PLAN NOTE
Orders:    Cholecalciferol (VITAMIN D3) 1,000 units tablet; Take 1 tablet (1,000 Units total) by mouth daily    Vitamin D 25 hydroxy; Future

## 2025-02-07 ENCOUNTER — RA CDI HCC (OUTPATIENT)
Dept: OTHER | Facility: HOSPITAL | Age: 65
End: 2025-02-07

## 2025-02-07 ENCOUNTER — APPOINTMENT (OUTPATIENT)
Dept: LAB | Facility: MEDICAL CENTER | Age: 65
End: 2025-02-07
Payer: COMMERCIAL

## 2025-02-07 DIAGNOSIS — E78.2 MIXED HYPERLIPIDEMIA: ICD-10-CM

## 2025-02-07 DIAGNOSIS — I10 PRIMARY HYPERTENSION: ICD-10-CM

## 2025-02-07 DIAGNOSIS — E78.01 FAMILIAL HYPERCHOLESTEROLEMIA: ICD-10-CM

## 2025-02-07 DIAGNOSIS — E55.9 VITAMIN D DEFICIENCY: ICD-10-CM

## 2025-02-07 LAB
25(OH)D3 SERPL-MCNC: 39.7 NG/ML (ref 30–100)
ANION GAP SERPL CALCULATED.3IONS-SCNC: 12 MMOL/L (ref 4–13)
BUN SERPL-MCNC: 15 MG/DL (ref 5–25)
CALCIUM SERPL-MCNC: 9.5 MG/DL (ref 8.4–10.2)
CHLORIDE SERPL-SCNC: 104 MMOL/L (ref 96–108)
CHOLEST SERPL-MCNC: 188 MG/DL (ref ?–200)
CO2 SERPL-SCNC: 26 MMOL/L (ref 21–32)
CREAT SERPL-MCNC: 0.76 MG/DL (ref 0.6–1.3)
GFR SERPL CREATININE-BSD FRML MDRD: 83 ML/MIN/1.73SQ M
GLUCOSE P FAST SERPL-MCNC: 102 MG/DL (ref 65–99)
HDLC SERPL-MCNC: 49 MG/DL
LDLC SERPL CALC-MCNC: 105 MG/DL (ref 0–100)
NONHDLC SERPL-MCNC: 139 MG/DL
POTASSIUM SERPL-SCNC: 3.8 MMOL/L (ref 3.5–5.3)
SODIUM SERPL-SCNC: 142 MMOL/L (ref 135–147)
TRIGL SERPL-MCNC: 171 MG/DL (ref ?–150)

## 2025-02-07 PROCEDURE — 36415 COLL VENOUS BLD VENIPUNCTURE: CPT

## 2025-02-07 PROCEDURE — 80061 LIPID PANEL: CPT

## 2025-02-07 PROCEDURE — 82306 VITAMIN D 25 HYDROXY: CPT

## 2025-02-07 PROCEDURE — 80048 BASIC METABOLIC PNL TOTAL CA: CPT

## 2025-02-07 RX ORDER — ROSUVASTATIN CALCIUM 5 MG/1
5 TABLET, COATED ORAL DAILY
Qty: 90 TABLET | Refills: 1 | Status: SHIPPED | OUTPATIENT
Start: 2025-02-07

## 2025-02-10 ENCOUNTER — OFFICE VISIT (OUTPATIENT)
Dept: FAMILY MEDICINE CLINIC | Facility: CLINIC | Age: 65
End: 2025-02-10
Payer: COMMERCIAL

## 2025-02-10 VITALS
OXYGEN SATURATION: 96 % | BODY MASS INDEX: 32.33 KG/M2 | SYSTOLIC BLOOD PRESSURE: 134 MMHG | WEIGHT: 206 LBS | RESPIRATION RATE: 16 BRPM | TEMPERATURE: 97.6 F | HEIGHT: 67 IN | DIASTOLIC BLOOD PRESSURE: 82 MMHG | HEART RATE: 95 BPM

## 2025-02-10 DIAGNOSIS — I49.3 PVC (PREMATURE VENTRICULAR CONTRACTION): ICD-10-CM

## 2025-02-10 DIAGNOSIS — E78.2 MIXED HYPERLIPIDEMIA: ICD-10-CM

## 2025-02-10 DIAGNOSIS — Z23 ENCOUNTER FOR IMMUNIZATION: ICD-10-CM

## 2025-02-10 DIAGNOSIS — Z78.9 ALCOHOL USE: ICD-10-CM

## 2025-02-10 DIAGNOSIS — Z00.00 ANNUAL PHYSICAL EXAM: Primary | ICD-10-CM

## 2025-02-10 DIAGNOSIS — I10 PRIMARY HYPERTENSION: ICD-10-CM

## 2025-02-10 DIAGNOSIS — R73.01 IFG (IMPAIRED FASTING GLUCOSE): ICD-10-CM

## 2025-02-10 DIAGNOSIS — E55.9 VITAMIN D DEFICIENCY: ICD-10-CM

## 2025-02-10 LAB — SL AMB POCT HEMOGLOBIN AIC: 5.6 (ref ?–6.5)

## 2025-02-10 PROCEDURE — 99396 PREV VISIT EST AGE 40-64: CPT

## 2025-02-10 PROCEDURE — 83036 HEMOGLOBIN GLYCOSYLATED A1C: CPT

## 2025-02-10 PROCEDURE — 90471 IMMUNIZATION ADMIN: CPT

## 2025-02-10 PROCEDURE — 90750 HZV VACC RECOMBINANT IM: CPT

## 2025-02-10 PROCEDURE — 99214 OFFICE O/P EST MOD 30 MIN: CPT

## 2025-02-10 NOTE — ASSESSMENT & PLAN NOTE
Working on cutting back intake.  Previously was drinking 3 strong drinks per night.  Now patient reports that she is down to 2 drinks nightly.  Discussed that the recommended intake for women is no more than 1 drink a day.  Again discussed not to cut off alcohol intake cold [place] due to system use.  Patient agrees to cutting back

## 2025-02-10 NOTE — ASSESSMENT & PLAN NOTE
History of PVCs noted on Holter monitor in the past.  Reports intermittent palpitation, nothing persistent or frequent.  Patient reports that this is much better than in the past after starting amlodipine 5 mg daily.

## 2025-02-10 NOTE — ASSESSMENT & PLAN NOTE
The 10-year ASCVD risk score (Aspen CHAUDHARY, et al., 2019) is: 7.5%    Values used to calculate the score:      Age: 64 years      Sex: Female      Is Non- : No      Diabetic: No      Tobacco smoker: No      Systolic Blood Pressure: 134 mmHg      Is BP treated: Yes      HDL Cholesterol: 49 mg/dL      Total Cholesterol: 188 mg/dL  Continue on Crestor 5 mg daily, tolerating well

## 2025-02-10 NOTE — PROGRESS NOTES
Adult Annual Physical  Name: Bianca Francisco      : 1960      MRN: [id number]  Encounter Provider: Shannan Lima DO  Encounter Date: 2/10/2025   Encounter department: [facility]    Assessment & Plan  Annual physical exam  - screening for cardiovascular disease: lipid, vit d, bmp reviewed today  - screening for breast cancer: mammogram last 3/2024, next scheduled   - screening for cervical cancer: last pap 2024 w HPV, negative; follows gyn  - screening for colon cancer: last colnoscopy 3/2022, f/u in 5 years  - immunizations: Shingrix dose 1 given today, second dose in 2 to 6 months.  Patient declines pneumonia vaccine.  - counseled pt on lifestyle modifications such as diet changes and 150 mins/weekly of moderate intensity exercise        Mixed hyperlipidemia  The 10-year ASCVD risk score (Aspen CHAUDHARY, et al., 2019) is: 7.5%    Values used to calculate the score:      Age: 64 years      Sex: Female      Is Non- : No      Diabetic: No      Tobacco smoker: No      Systolic Blood Pressure: 134 mmHg      Is BP treated: Yes      HDL Cholesterol: 49 mg/dL      Total Cholesterol: 188 mg/dL  Continue on Crestor 5 mg daily, tolerating well       Vitamin D deficiency  Vitamin D level 39  Continue with vitamin D 1000 IU daily       Primary hypertension  /82 in office today  Continue amlodipine 5 mg daily, tolerating well       PVC (premature ventricular contraction)  History of PVCs noted on Holter monitor in the past.  Reports intermittent palpitation, nothing persistent or frequent.  Patient reports that this is much better than in the past after starting amlodipine 5 mg daily.       Encounter for immunization    Orders:    Zoster Vaccine Recombinant IM    Alcohol use  Working on cutting back intake.  Previously was drinking 3 strong drinks per night.  Now patient reports that she is down to 2 drinks nightly.  Discussed that the recommended intake for women is no  more than 1 drink a day.  Again discussed not to cut off alcohol intake cold [place] due to system use.  Patient agrees to cutting back       IFG (impaired fasting glucose)  Glucose 102 on recent labs.  Last A1c in 2022 was 5.7.  A1c rechecked today in office was 5.6.  Continue with lifestyle modifications with diet and exercise  Orders:    POCT hemoglobin A1c    This is both a : Annual Physical and problem-focused visit as agreed by the patient.  Chronic conditions stable.  Previous labs reviewed.  Continue current medications.  Follow-up in 1 year for annual physical and follow-up of chronic conditions or sooner if needed.    Immunizations and preventive care screenings were discussed with patient today. Appropriate education was printed on patient's after visit summary.    Counseling:  Alcohol/drug use: discussed moderation in alcohol intake, the recommendations for healthy alcohol use, and avoidance of illicit drug use.  Dental Health: discussed importance of regular tooth brushing, flossing, and dental visits.  Injury prevention: discussed safety/seat belts, safety helmets, smoke detectors, carbon monoxide detectors, and smoking near bedding or upholstery.  Sexual health: discussed sexually transmitted diseases, partner selection, use of condoms, avoidance of unintended pregnancy, and contraceptive alternatives.  Exercise: the importance of regular exercise/physical activity was discussed. Recommend exercise 3-5 times per week for at least 30 minutes.          History of Present Illness     Adult Annual Physical:  Patient presents for annual physical. Doing well, no acute concerns or complaints.  Recently quit her job and is looking for things to do -enjoys volunteering.  Usually takes walks or rides her bike for exercise, difficult to do recently with the weather.  Does report that she has cut back on her alcohol intake, now drinking only 2 drinks at night -usually    Takes walks, ride bike.      Currently  drinking 2 drinks at night, vodka and pineapple juice on ice. Trying to cut back.  .     Diet and Physical Activity:  - Diet/Nutrition: well balanced diet, consuming 3-5 servings of fruits/vegetables daily, adequate whole grain intake and adequate fiber intake. working  - Exercise: no formal exercise. back on the bike and walking    Depression Screening:  - PHQ-2 Score: 0    General Health:  - Sleep: sleeps well.  - Hearing: normal hearing bilateral ears.  - Vision: wears glasses, goes for regular eye exams and no vision problems. Follows with Dr. Olivarez at [facility], wears readers  - Dental: regular dental visits and brushes teeth twice daily.    /GYN Health:  - Follows with GYN: yes.   - Menopause: postmenopausal.   - History of STDs: no    Review of Systems   Constitutional:  Negative for chills and fever.   HENT:  Negative for congestion and rhinorrhea.    Eyes:  Negative for visual disturbance.   Respiratory:  Negative for cough and shortness of breath.    Cardiovascular:  Negative for chest pain and palpitations.   Gastrointestinal:  Negative for abdominal pain, constipation, diarrhea, nausea and vomiting.   Genitourinary:  Negative for dysuria.   Musculoskeletal:  Negative for arthralgias.   Skin:  Negative for rash.   Neurological:  Negative for dizziness, light-headedness and headaches.     Current Outpatient Medications on File Prior to Visit   Medication Sig Dispense Refill    amLODIPine (NORVASC) 5 mg tablet TAKE 1 TABLET (5 MG TOTAL) BY MOUTH DAILY. 90 tablet 0    Cholecalciferol (VITAMIN D3) 1,000 units tablet Take 1 tablet (1,000 Units total) by mouth daily 90 tablet 1    hydrOXYzine HCL (ATARAX) 25 mg tablet Take 1 tablet (25 mg total) by mouth every 6 (six) hours as needed for itching 20 tablet 0    Nutritional Supplements (VITAMIN D MAINTENANCE PO) Take 2,000 Units by mouth daily      omeprazole (PriLOSEC) 40 MG capsule TAKE 1 CAPSULE (40 MG TOTAL) BY MOUTH DAILY. 90 capsule 1     "rosuvastatin (CRESTOR) 5 mg tablet TAKE 1 TABLET (5 MG TOTAL) BY MOUTH DAILY. 90 tablet 1     No current facility-administered medications on file prior to visit.        Objective   /82 (BP Location: Left arm, Patient Position: Sitting, Cuff Size: Large)   Pulse 95   Temp 97.6 °F (36.4 °C) (Temporal)   Resp 16   Ht 5' 7\" (1.702 m)   Wt 93.4 kg (206 lb)   LMP  (LMP Unknown)   SpO2 96%   BMI 32.26 kg/m²     Physical Exam  Vitals reviewed.   Constitutional:       Appearance: Normal appearance.   HENT:      Head: Normocephalic and atraumatic.      Right Ear: External ear normal.      Left Ear: External ear normal.      Nose: Nose normal.      Mouth/Throat:      Pharynx: Oropharynx is clear.   Eyes:      Extraocular Movements: Extraocular movements intact.      Conjunctiva/sclera: Conjunctivae normal.   Cardiovascular:      Rate and Rhythm: Normal rate and regular rhythm.      Pulses: Normal pulses.      Heart sounds: Normal heart sounds.   Pulmonary:      Effort: Pulmonary effort is normal.      Breath sounds: Normal breath sounds.   Abdominal:      Palpations: Abdomen is soft.   Musculoskeletal:      Cervical back: Neck supple.      Right lower leg: No edema.      Left lower leg: No edema.   Skin:     General: Skin is warm.   Neurological:      Mental Status: She is alert and oriented to person, place, and time.   Psychiatric:         Mood and Affect: Mood normal.         Behavior: Behavior normal.         Thought Content: Thought content normal.         Judgment: Judgment normal.       Administrative Statements   I have spent a total time of 40 minutes in caring for this patient on the day of the visit/encounter including Diagnostic results, Prognosis, Risks and benefits of tx options, Instructions for management, Patient and family education, Importance of tx compliance, Risk factor reductions, Documenting in the medical record, Reviewing / ordering tests, medicine, procedures  , and Obtaining or " reviewing history  .

## 2025-02-10 NOTE — PATIENT INSTRUCTIONS
"Patient Education     Routine physical for adults   The Basics   Written by the doctors and editors at [facility]   What is a physical? -- A physical is a routine visit, or \"check-up,\" with your doctor. You might also hear it called a \"wellness visit\" or \"preventive visit.\"  During each visit, the doctor will:   Ask about your physical and mental health   Ask about your habits, behaviors, and lifestyle   Do an exam   Give you vaccines if needed   Talk to you about any medicines you take   Give advice about your health   Answer your questions  Getting regular check-ups is an important part of taking care of your health. It can help your doctor find and treat any problems you have. But it's also important for preventing health problems.  A routine physical is different from a \"sick visit.\" A sick visit is when you see a doctor because of a health concern or problem. Since physicals are scheduled ahead of time, you can think about what you want to ask the doctor.  How often should I get a physical? -- It depends on your age and health. In general, for people age 21 years and older:   If you are younger than 50 years, you might be able to get a physical every 3 years.   If you are 50 years or older, your doctor might recommend a physical every year.  If you have an ongoing health condition, like diabetes or high blood pressure, your doctor will probably want to see you more often.  What happens during a physical? -- In general, each visit will include:   Physical exam - The doctor or nurse will check your height, weight, heart rate, and blood pressure. They will also look at your eyes and ears. They will ask about how you are feeling and whether you have any symptoms that bother you.   Medicines - It's a good idea to bring a list of all the medicines you take to each doctor visit. Your doctor will talk to you about your medicines and answer any questions. Tell them if you are having any side effects that bother you. You " "should also tell them if you are having trouble paying for any of your medicines.   Habits and behaviors - This includes:   Your diet   Your exercise habits   Whether you smoke, drink alcohol, or use drugs   Whether you are sexually active   Whether you feel safe at home  Your doctor will talk to you about things you can do to improve your health and lower your risk of health problems. They will also offer help and support. For example, if you want to quit smoking, they can give you advice and might prescribe medicines. If you want to improve your diet or get more physical activity, they can help you with this, too.   Lab tests, if needed - The tests you get will depend on your age and situation. For example, your doctor might want to check your:   Cholesterol   Blood sugar   Iron level   Vaccines - The recommended vaccines will depend on your age, health, and what vaccines you already had. Vaccines are very important because they can prevent certain serious or deadly infections.   Discussion of screening - \"Screening\" means checking for diseases or other health problems before they cause symptoms. Your doctor can recommend screening based on your age, risk, and preferences. This might include tests to check for:   Cancer, such as breast, prostate, cervical, ovarian, colorectal, prostate, lung, or skin cancer   Sexually transmitted infections, such as chlamydia and gonorrhea   Mental health conditions like depression and anxiety  Your doctor will talk to you about the different types of screening tests. They can help you decide which screenings to have. They can also explain what the results might mean.   Answering questions - The physical is a good time to ask the doctor or nurse questions about your health. If needed, they can refer you to other doctors or specialists, too.  Adults older than 65 years often need other care, too. As you get older, your doctor will talk to you about:   How to prevent falling at " home   Hearing or vision tests   Memory testing   How to take your medicines safely   Making sure that you have the help and support you need at home  All topics are updated as new evidence becomes available and our peer review process is complete.  This topic retrieved from [employer] on: May 02, 2024.  Topic 665414 Version 1.0  Release: 32.4.3 - C32.122  © 2024 UpToDate, Inc. and/or its affiliates. All rights reserved.  Consumer Information Use and Disclaimer   Disclaimer: This generalized information is a limited summary of diagnosis, treatment, and/or medication information. It is not meant to be comprehensive and should be used as a tool to help the user understand and/or assess potential diagnostic and treatment options. It does NOT include all information about conditions, treatments, medications, side effects, or risks that may apply to a specific patient. It is not intended to be medical advice or a substitute for the medical advice, diagnosis, or treatment of a health care provider based on the health care provider's examination and assessment of a patient's specific and unique circumstances. Patients must speak with a health care provider for complete information about their health, medical questions, and treatment options, including any risks or benefits regarding use of medications. This information does not endorse any treatments or medications as safe, effective, or approved for treating a specific patient. UpToDate, Inc. and its affiliates disclaim any warranty or liability relating to this information or the use thereof.The use of this information is governed by the Terms of Use, available at https://www.[employer] Engineeringuwer.com/en/know/clinical-effectiveness-terms. 2024© UpToDate, Inc. and its affiliates and/or licensors. All rights reserved.  Copyright   © 2024 UpToDate, Inc. and/or its affiliates. All rights reserved.

## 2025-04-06 DIAGNOSIS — I10 PRIMARY HYPERTENSION: ICD-10-CM

## 2025-04-07 RX ORDER — AMLODIPINE BESYLATE 5 MG/1
5 TABLET ORAL DAILY
Qty: 90 TABLET | Refills: 1 | Status: SHIPPED | OUTPATIENT
Start: 2025-04-07

## 2025-04-20 DIAGNOSIS — K21.9 GASTROESOPHAGEAL REFLUX DISEASE: ICD-10-CM

## 2025-04-20 RX ORDER — OMEPRAZOLE 40 MG/1
40 CAPSULE, DELAYED RELEASE ORAL DAILY
Qty: 90 CAPSULE | Refills: 1 | Status: SHIPPED | OUTPATIENT
Start: 2025-04-20

## 2025-05-12 ENCOUNTER — CLINICAL SUPPORT (OUTPATIENT)
Dept: FAMILY MEDICINE CLINIC | Facility: CLINIC | Age: 65
End: 2025-05-12
Payer: COMMERCIAL

## 2025-05-12 DIAGNOSIS — Z23 ENCOUNTER FOR IMMUNIZATION: Primary | ICD-10-CM

## 2025-05-12 PROCEDURE — 90750 HZV VACC RECOMBINANT IM: CPT

## 2025-05-12 PROCEDURE — 90471 IMMUNIZATION ADMIN: CPT

## 2025-05-13 ENCOUNTER — HOSPITAL ENCOUNTER (OUTPATIENT)
Dept: RADIOLOGY | Facility: MEDICAL CENTER | Age: 65
Discharge: HOME | End: 2025-05-13
Payer: COMMERCIAL

## 2025-05-13 VITALS — WEIGHT: 206 LBS | BODY MASS INDEX: 32.33 KG/M2 | HEIGHT: 67 IN

## 2025-05-13 DIAGNOSIS — Z12.31 SCREENING MAMMOGRAM, ENCOUNTER FOR: ICD-10-CM

## 2025-05-13 PROCEDURE — 77067 SCR MAMMO BI INCL CAD: CPT

## 2025-05-13 PROCEDURE — 77063 BREAST TOMOSYNTHESIS BI: CPT

## 2025-05-20 ENCOUNTER — RESULTS FOLLOW-UP (OUTPATIENT)
Dept: OBGYN CLINIC | Facility: CLINIC | Age: 65
End: 2025-05-20

## 2025-06-05 NOTE — PROGRESS NOTES
Name: Bianca Francisco      : 1960      MRN: [id number]  Encounter Provider: JERRICA Bain  Encounter Date: 2025   Encounter department: [facility] [place]    :  Assessment & Plan  Encounter for gynecological examination (general) (routine) without abnormal findings  Annual GYN examination completed today.   Health maintenance reviewed/updated as appropriate  Risk prevention and anticipatory guidance provided including:  Encouraged regular self breast exams and to call with changes   Age related Calcium and vitamin D intake  Dietary and lifestyle recommendations based on her age and weight. body mass index is unknown because there is no height or weight on file..    Tobacco and alcohol use, intervention ordered if applicable.   Condom use for prevention of STI's if sexually active.       Encounter for osteoporosis screening in asymptomatic postmenopausal patient  Due to start dexa bone density screening after birthday in August. Order placed  Orders:  •  DXA bone density spine hip and pelvis; Future    Encounter for screening mammogram for malignant neoplasm of breast  Just completed mammo last ayaka and wnl. New order placed for next May   Orders:  •  Mammo screening bilateral w 3d and cad; Future          Subjective:      History of Present Illness     Bianca Francisco is a 64 y.o. female. Here for Gynecologic Exam (Patient presents for a routine annual visit/Postmenopausal/Last Pap Smear- 24 -/-/Mammogram-25 normal /Colonoscopy-22 recall 5 years /DXA- /Non smoker/Social drinker/Currently sexually active/Sister breast cancer /Sister colon cancer /Brother colon cancer /No concerns/questions for today's visit )      Pt is postmenopausal since mid . She denies PMB.   She denies any C/O abnormal vaginal discharge or irritation. Denies Urinary frequency, urgency or dysuria, Does note occasional spontaneous leakage of urine but is minor to her.  "Denies stress or urge incontinence  Denies breast changes    Sexually active: yes; Monogamous/- male partner  Denies C/O related to intimacy/sexual activity  Vaginal dryness: denies    She reports she feels safe at home.   Lifestyle/exercise: bikes and active.  Dietary calcium/vit D  intake: adequate    HEALTH MAINTENANCE SCREENINGS:     Previous pap smears and ASCCP screening guidelines have been reviewed.   Last Pap:  02/05/2024;no longer needed  History of abnormal pap: No,   Last mammogram:  05/13/2025  Last Colon Cancer Screening: colonoscopy: 06/17/2022 Cologuard:Not on file. Recall 5yrs  Last Dexa Scan: not yet- due next year    Substance Abuse Screening Completed. See hx and flowsheet.    Review of Systems   Constitutional:  Negative for appetite change, chills, fatigue, fever and unexpected weight change.   HENT: Negative.     Eyes: Negative.    Respiratory:  Negative for chest tightness and shortness of breath.    Cardiovascular:  Negative for chest pain and palpitations.   Gastrointestinal:  Negative for abdominal pain, constipation and vomiting.   Endocrine: Negative for cold intolerance and heat intolerance.   Genitourinary:         As per HPI   Musculoskeletal:  Negative for back pain, joint swelling and neck pain.   Skin:  Negative for color change and rash.   Neurological:  Negative for dizziness, weakness and numbness.   Hematological:  Does not bruise/bleed easily.   Psychiatric/Behavioral: Negative.         The following portions of the patient's history were reviewed and updated as appropriate: allergies, current medications, past family history, past medical history, past social history, past surgical history, and problem list.         Objective   /70 (BP Location: Left arm, Patient Position: Sitting, Cuff Size: Large)   Ht 5' 7\" (1.702 m)   Wt 93.4 kg (206 lb)   LMP  (LMP Unknown)   BMI 32.26 kg/m²     Physical Exam  Constitutional:       General: She is not in acute " distress.     Appearance: Normal appearance.   Genitourinary:      Vulva and rectum normal.      No lesions in the vagina.      Right Labia: No rash or lesions.     Left Labia: No lesions or rash.     No vaginal discharge, erythema, tenderness or bleeding.      No vaginal prolapse present.     Mild vaginal atrophy present.       Right Adnexa: not tender and no mass present.     Left Adnexa: not tender and no mass present.     No cervical motion tenderness, discharge or friability.      Uterus is not enlarged or tender.      No urethral prolapse present.      Pelvic exam was performed with patient in the lithotomy position.   Breasts:     Breasts are symmetrical.      Right: No inverted nipple, mass, nipple discharge, skin change or tenderness.      Left: No inverted nipple, mass, nipple discharge, skin change or tenderness.   HENT:      Head: Normocephalic and atraumatic.     Cardiovascular:      Rate and Rhythm: Normal rate.      Heart sounds: No murmur heard.  Pulmonary:      Effort: Pulmonary effort is normal.      Breath sounds: Normal breath sounds.   Abdominal:      General: There is no distension.      Palpations: Abdomen is soft.      Tenderness: There is no abdominal tenderness.     Musculoskeletal:         General: Normal range of motion.      Cervical back: Normal range of motion.   Lymphadenopathy:      Cervical: No cervical adenopathy.     Neurological:      Mental Status: She is alert and oriented to person, place, and time.     Skin:     General: Skin is warm and dry.     Psychiatric:         Mood and Affect: Mood normal.         Behavior: Behavior normal.   Vitals reviewed.

## 2025-06-06 ENCOUNTER — ANNUAL EXAM (OUTPATIENT)
Dept: OBGYN CLINIC | Facility: MEDICAL CENTER | Age: 65
End: 2025-06-06
Payer: COMMERCIAL

## 2025-06-06 VITALS
BODY MASS INDEX: 32.33 KG/M2 | SYSTOLIC BLOOD PRESSURE: 124 MMHG | WEIGHT: 206 LBS | DIASTOLIC BLOOD PRESSURE: 70 MMHG | HEIGHT: 67 IN

## 2025-06-06 DIAGNOSIS — Z78.0 ENCOUNTER FOR OSTEOPOROSIS SCREENING IN ASYMPTOMATIC POSTMENOPAUSAL PATIENT: ICD-10-CM

## 2025-06-06 DIAGNOSIS — Z01.419 ENCOUNTER FOR GYNECOLOGICAL EXAMINATION (GENERAL) (ROUTINE) WITHOUT ABNORMAL FINDINGS: Primary | ICD-10-CM

## 2025-06-06 DIAGNOSIS — Z13.820 ENCOUNTER FOR OSTEOPOROSIS SCREENING IN ASYMPTOMATIC POSTMENOPAUSAL PATIENT: ICD-10-CM

## 2025-06-06 DIAGNOSIS — Z12.31 ENCOUNTER FOR SCREENING MAMMOGRAM FOR MALIGNANT NEOPLASM OF BREAST: ICD-10-CM

## 2025-06-06 PROCEDURE — S0612 ANNUAL GYNECOLOGICAL EXAMINA: HCPCS | Performed by: CLINICAL NURSE SPECIALIST

## (undated) DEVICE — ALL PURPOSE SPONGES,NON-WOVEN, 4 PLY: Brand: CURITY

## (undated) DEVICE — GLOVE INDICATOR PI UNDERGLOVE SZ 8 BLUE

## (undated) DEVICE — GAUZE SPONGES,16 PLY: Brand: CURITY

## (undated) DEVICE — OCCLUSIVE GAUZE STRIP,3% BISMUTH TRIBROMOPHENATE IN PETROLATUM BLEND: Brand: XEROFORM

## (undated) DEVICE — STERILE BETHLEHEM PLASTIC HAND: Brand: CARDINAL HEALTH

## (undated) DEVICE — 3M™ TEGADERM™ TRANSPARENT FILM DRESSING FRAME STYLE, 1624W, 2-3/8 IN X 2-3/4 IN (6 CM X 7 CM), 100/CT 4CT/CASE: Brand: 3M™ TEGADERM™

## (undated) DEVICE — 3M™ TEGADERM™ TRANSPARENT FILM DRESSING FRAME STYLE, 1626W, 4 IN X 4-3/4 IN (10 CM X 12 CM), 50/CT 4CT/CASE: Brand: 3M™ TEGADERM™

## (undated) DEVICE — ACE WRAP 2 IN UNSTERILE

## (undated) DEVICE — INTENDED FOR TISSUE SEPARATION, AND OTHER PROCEDURES THAT REQUIRE A SHARP SURGICAL BLADE TO PUNCTURE OR CUT.: Brand: BARD-PARKER ® CARBON RIB-BACK BLADES

## (undated) DEVICE — LIGHT HANDLE COVER SLEEVE DISP BLUE STELLAR

## (undated) DEVICE — GLOVE SRG BIOGEL ECLIPSE 8

## (undated) DEVICE — SUT ETHILON 3-0 PS-1 18 IN 1663G

## (undated) DEVICE — CHLORAPREP HI-LITE 26ML ORANGE

## (undated) DEVICE — CUFF TOURNIQUET 18 X 4 IN QUICK CONNECT DISP 1 BLADDER

## (undated) DEVICE — INTENDED FOR TISSUE SEPARATION, AND OTHER PROCEDURES THAT REQUIRE A SHARP SURGICAL BLADE TO PUNCTURE OR CUT.: Brand: BARD-PARKER SAFETY BLADES SIZE 15, STERILE

## (undated) DEVICE — SPONGE PVP SCRUB WING STERILE